# Patient Record
Sex: FEMALE | Race: WHITE | Employment: FULL TIME | URBAN - METROPOLITAN AREA
[De-identification: names, ages, dates, MRNs, and addresses within clinical notes are randomized per-mention and may not be internally consistent; named-entity substitution may affect disease eponyms.]

---

## 2017-02-03 ENCOUNTER — TELEPHONE (OUTPATIENT)
Dept: FAMILY MEDICINE | Facility: CLINIC | Age: 67
End: 2017-02-03

## 2017-02-03 NOTE — Clinical Note
[facility] [place]  [address]  [facility] [address]  [phone]                                                                                                           Glendy Saravia  [address] [place] [place] [address]    February 3, 2017          Dear Glendy Saravia,      We care about your well-being!  In order to ensure we are providing the best quality care, we have reviewed your chart and see that you are due for:     __X___ Physical   __X___ Pap Smear   __X___ Mammogram   _____ Diabetes Followup   _____ Hypertension Followup   _____ Cholesterol Followup (Provider Appointment)   _____ Cholesterol Test (Lab-Only Appointment)   _____ Other:       We can assist you in scheduling these appointments at [phone].    If you have had (or plan to have) any of these tests done at a facility other than [facility] or a [facility], please have the results from these tests sent to your primary physician at [facility].             Sincerely,    Eloy Medina MD

## 2017-02-03 NOTE — TELEPHONE ENCOUNTER
Panel Management Review      Patient has the following on her problem list: None      Composite cancer screening  Chart review shows that this patient is due/due soon for the following Pap Smear and Mammogram  Summary:    Patient is due/failing the following:   MAMMOGRAM and PAP    Action needed:   Patient needs office visit for pap and mammogram.    Type of outreach:    Sent letter.    Questions for provider review:    None                                                                                                                                    Johanna Campbell CMA

## 2017-02-08 PROBLEM — Z95.3 S/P AORTIC VALVE REPLACEMENT WITH BIOPROSTHETIC VALVE: Status: ACTIVE | Noted: 2017-02-08

## 2017-02-13 ENCOUNTER — HOSPITAL ENCOUNTER (OUTPATIENT)
Dept: CARDIOLOGY | Facility: CLINIC | Age: 67
Discharge: HOME | End: 2017-02-13
Attending: INTERNAL MEDICINE | Admitting: INTERNAL MEDICINE
Payer: COMMERCIAL

## 2017-02-13 DIAGNOSIS — I35.9 AORTIC VALVE DISORDER: ICD-10-CM

## 2017-02-13 PROCEDURE — 93306 TTE W/DOPPLER COMPLETE: CPT

## 2017-02-13 PROCEDURE — 93306 TTE W/DOPPLER COMPLETE: CPT | Mod: 26 | Performed by: INTERNAL MEDICINE

## 2017-02-14 ENCOUNTER — OFFICE VISIT (OUTPATIENT)
Dept: CARDIOLOGY | Facility: CLINIC | Age: 67
End: 2017-02-14
Attending: INTERNAL MEDICINE
Payer: COMMERCIAL

## 2017-02-14 VITALS
BODY MASS INDEX: 35.58 KG/M2 | DIASTOLIC BLOOD PRESSURE: 90 MMHG | SYSTOLIC BLOOD PRESSURE: 138 MMHG | HEART RATE: 76 BPM | HEIGHT: 66 IN | WEIGHT: 221.4 LBS

## 2017-02-14 DIAGNOSIS — Z95.3 S/P AORTIC VALVE REPLACEMENT WITH BIOPROSTHETIC VALVE: Primary | ICD-10-CM

## 2017-02-14 DIAGNOSIS — E78.2 MIXED HYPERLIPIDEMIA: ICD-10-CM

## 2017-02-14 DIAGNOSIS — R00.0 TACHYCARDIA: ICD-10-CM

## 2017-02-14 DIAGNOSIS — I10 BENIGN ESSENTIAL HYPERTENSION: ICD-10-CM

## 2017-02-14 DIAGNOSIS — I35.9 AORTIC VALVE DISORDER: ICD-10-CM

## 2017-02-14 DIAGNOSIS — R00.2 PALPITATIONS: ICD-10-CM

## 2017-02-14 PROCEDURE — 99214 OFFICE O/P EST MOD 30 MIN: CPT | Performed by: INTERNAL MEDICINE

## 2017-02-14 RX ORDER — VALACYCLOVIR HYDROCHLORIDE 1 G/1
1000 TABLET, FILM COATED ORAL PRN
COMMUNITY
End: 2017-10-27

## 2017-02-14 RX ORDER — DEXTROAMPHETAMINE SACCHARATE, AMPHETAMINE ASPARTATE MONOHYDRATE, DEXTROAMPHETAMINE SULFATE AND AMPHETAMINE SULFATE 7.5; 7.5; 7.5; 7.5 MG/1; MG/1; MG/1; MG/1
60 CAPSULE, EXTENDED RELEASE ORAL DAILY
COMMUNITY

## 2017-02-14 RX ORDER — FLUOXETINE 10 MG/1
CAPSULE ORAL
COMMUNITY

## 2017-02-14 NOTE — LETTER
2017         Eloy Medina MD    [facility]   [address] [place]   [address]      RE:     Glendy Saravia   MRN:  [id number]   : 1950      Dear Eloy:       I had the pleasure of following up on our mutual patient, Glendy Saravia.  She is a delightful 66-year-old woman.  I have not seen her for about 3 years.  In  she had a bicuspid aortic valve with aortic aneurysm.  She had homograft placed with Dr. Lockhart.  She has done well over the intervening years.  She reports no new cardiovascular complaints, specifically no chest pain, dizziness, syncope, palpitations, heart failure symptoms.  We reviewed her echocardiogram today going back to several years.  Current echo shows that the homograft is probably starting to show its age.  The estimated aortic valve area is about 1.4 cm2 with a mean gradient of 15 mmHg and trace aortic insufficiency.  By comparison, the echo in  showed an aortic valve area of 2.3 cm2 with an aortic valve gradient of 10.  This suggests still only mild aortic valve disease.  In addition the echo showed moderate LVH.  While this may be residual from her aortic valve in the first place, I am getting consistently elevated blood pressures here today.   I checked it 4-5 times.  Although the nurse's number was a little lower, I checked it and I was generally getting the 140s over the high 80s to low 90s.  Also, I see that you did blood work last year and her LDL cholesterol was over 160.  At the time of her heart surgery which was 14 years ago, there was no coronary disease.  I think we should recommend the following:  I have given her a YOOSE blood pressure machine.   I am going to have her check her blood pressure at home over the next week or two and we will have her come back and we will review the blood pressure numbers.  Given that she has LVH on her echo and she has previous aortic aneurysm, I would like to see the systolic  blood pressures in the mid 130 range or less.  In addition, I am going to recheck the cholesterol numbers, and if they are just as high now as they were last year, we would probably recommend starting a statin for prevention.  I will make these arrangements over the next couple of weeks.                      I spent quite a bit of time reviewing the echo with her, going through the blood pressure numbers, showing her how to work the automated blood pressure machine and explained to her why we plan on doing this.   This ended up being a 45-minute visit; greater than 50% counseling.      Sincerely,      Romain Tai MD

## 2017-02-14 NOTE — MR AVS SNAPSHOT
After Visit Summary   2/14/2017    Glendy Saravia    MRN: [id number]           Patient Information     Date Of Birth          1950        Visit Information        Provider Department      2/14/2017 11:00 AM Romain Tai MD [facility]        Today's Diagnoses     S/P aortic valve replacement with bioprosthetic valve    -  1    Aortic valve disorder        Mixed hyperlipidemia        Palpitations        Tachycardia        Benign essential hypertension           Follow-ups after your visit        Additional Services     Follow-Up with Cardiac Advanced Practice Provider       Or phuc or any np                  Future tests that were ordered for you today     Open Future Orders        Priority Expected Expires Ordered    Lipid Profile Routine 2/21/2017 2/14/2018 2/14/2017    ALT Routine 2/21/2017 2/14/2018 2/14/2017    Follow-Up with Cardiac Advanced Practice Provider Routine 2/21/2017 2/14/2018 2/14/2017            Who to contact     If you have questions or need follow up information about today's clinic visit or your schedule please contact [facility] directly at [phone].  Normal or non-critical lab and imaging results will be communicated to you by [employer], letter or phone within 4 business days after the clinic has received the results. If you do not hear from us within 7 days, please contact the clinic through [employer] or phone. If you have a critical or abnormal lab result, we will notify you by phone as soon as possible.  Submit refill requests through [employer] or call your pharmacy and they will forward the refill request to us. Please allow 3 business days for your refill to be completed.          Additional Information About Your Visit        MyChart Information     [employer] lets you send messages to your doctor, view your test results, renew your prescriptions, schedule appointments and more. To  "sign up, go to [email or web address] . Click on \"Log in\" on the left side of the screen, which will take you to the Welcome page. Then click on \"Sign up Now\" on the right side of the page.     You will be asked to enter the access code listed below, as well as some personal information. Please follow the directions to create your username and password.     Your access code is: [email or web address]  Expires: 5/15/2017 11:50 AM     Your access code will  in 90 days. If you need help or a new code, please call your [facility] clinic or [phone].        Care EveryWhere ID     This is your Care EveryWhere ID. This could be used by other organizations to access your [facility] medical records  [id number]        Your Vitals Were     Pulse Height BMI (Body Mass Index)             76 1.664 m (5' 5.5\") 36.28 kg/m2          Blood Pressure from Last 3 Encounters:   17 138/90   16 110/68   14 122/78    Weight from Last 3 Encounters:   17 100.4 kg (221 lb 6.4 oz)   16 93.9 kg (207 lb)   14 94.8 kg (209 lb)              We Performed the Following     Follow-Up with Cardiologist        Primary Care Provider Office Phone # Fax #    Eloy Medina [phone]       [facility] [address] [facility] [address]        Thank you!     Thank you for choosing [facility] [place]  for your care. Our goal is always to provide you with excellent care. Hearing back from our patients is one way we can continue to improve our services. Please take a few minutes to complete the written survey that you may receive in the mail after your visit with us. Thank you!             Your Updated Medication List - Protect others around you: Learn how to safely use, store and throw away your medicines at www.disposemymeds.org.          This list is accurate as of: 17 11:51 AM.  Always use your most recent med list.                   Brand Name " Dispense Instructions for use    ADDERALL XR 30 MG per 24 hr capsule   Generic drug:  amphetamine-dextroamphetamine      Take 60 mg by mouth daily       aspirin 81 MG tablet      Take 1 tablet by mouth daily.       DAILY MULTIVITAMIN PO      Take 1 tablet by mouth daily       PROZAC 10 MG capsule   Generic drug:  FLUoxetine      30 mg in am 10 mg in pm       VALTREX 1000 mg tablet   Generic drug:  valACYclovir      Take 1,000 mg by mouth as needed

## 2017-02-14 NOTE — PROGRESS NOTES
HPI and Plan:   See dictation    Orders Placed This Encounter   Procedures     Lipid Profile     ALT     Follow-Up with Cardiac Advanced Practice Provider     Orders Placed This Encounter   Medications     amphetamine-dextroamphetamine (ADDERALL XR) 30 MG per 24 hr capsule     Sig: Take 60 mg by mouth daily     FLUoxetine (PROZAC) 10 MG capsule     Si mg in am 10 mg in pm     valACYclovir (VALTREX) 1000 mg tablet     Sig: Take 1,000 mg by mouth as needed     Medications Discontinued During This Encounter   Medication Reason     Amphetamine-Dextroamphetamine (ADDERALL PO) Medication Reconciliation Clean Up     FLUoxetine (PROZAC) 10 MG capsule Medication Reconciliation Clean Up     buPROPion (WELLBUTRIN XL) 150 MG 24 hr tablet Medication Reconciliation Clean Up     valACYclovir (VALTREX) 1000 mg tablet Medication Reconciliation Clean Up         Encounter Diagnoses   Name Primary?     Aortic valve disorder      S/P aortic valve replacement with bioprosthetic valve Yes     Mixed hyperlipidemia      Palpitations      Tachycardia      Benign essential hypertension        CURRENT MEDICATIONS:  Current Outpatient Prescriptions   Medication Sig Dispense Refill     amphetamine-dextroamphetamine (ADDERALL XR) 30 MG per 24 hr capsule Take 60 mg by mouth daily       FLUoxetine (PROZAC) 10 MG capsule 30 mg in am 10 mg in pm       valACYclovir (VALTREX) 1000 mg tablet Take 1,000 mg by mouth as needed       Multiple Vitamin (DAILY MULTIVITAMIN PO) Take 1 tablet by mouth daily       aspirin 81 MG tablet Take 1 tablet by mouth daily.         ALLERGIES     Allergies   Allergen Reactions     Metformin GI Disturbance       PAST MEDICAL HISTORY:  Past Medical History   Diagnosis Date     Aortic insufficiency      Aortic stenosis      Aortic valve disease      bioprosthetic valve, composite graft for aortic valve, and acsending aorta     Attention deficit disorder      Depression      Diverticulosis      Edema       "Hyperlipidemia      Hyperlipidemia      Insulin resistance      Menopausal and perimenopausal disorder      Palpitations      Snoring      Thoracic aneurysm without mention of rupture 5/20/03     Wart        PAST SURGICAL HISTORY:  Past Surgical History   Procedure Laterality Date     Endovascular repair aneurysm thoracic aortic  5-03     Bicuspid Aortic valve with Ascending aortic aneurysm, post status replacement aortic valve and ascending aorta with composite homograft and reimplantation of her coronary arteries     Lasik       Coronary angiography adult order  4/9/03     LEFT DOMINANT CORONARY SYSTEM WITH ESSENTIALLY NORMAL CORONARY     Orthopedic surgery Left 2011     Lt knee arthroscopy      Tonsillectomy       Mammoplasty reduction       Tubal ligation         FAMILY HISTORY:  Family History   Problem Relation Age of Onset     Respiratory Father      Coronary Artery Disease No family hx of        SOCIAL HISTORY:  Social History     Social History     Marital status:      Spouse name: N/A     Number of children: N/A     Years of education: N/A     Social History Main Topics     Smoking status: Former Smoker     Types: Cigarettes     Smokeless tobacco: Never Used      Comment: stopped on/off     Alcohol use Yes      Comment: 1 per day     Drug use: No     Sexual activity: No     Other Topics Concern     Parent/Sibling W/ Cabg, Mi Or Angioplasty Before 65f 55m? No     Social History Narrative       Review of Systems:  Skin:  Negative     Eyes:  Positive for glasses  ENT:  Negative    Respiratory:  Negative    Cardiovascular:    Positive for;palpitations;edema  Gastroenterology: Positive for heartburn  Genitourinary:  Negative    Musculoskeletal:  Positive for joint swelling  Neurologic:  Positive for headaches;migraine headaches  Psychiatric:  Positive for    Heme/Lymph/Imm:  Positive for allergies  Endocrine:  Negative      Physical Exam:  Vitals: /90  Pulse 76  Ht 1.664 m (5' 5.5\")  Wt 100.4 kg " (221 lb 6.4 oz)  BMI 36.28 kg/m2    Constitutional:  cooperative, alert and oriented, well developed, well nourished, in no acute distress        Skin:  warm and dry to the touch, no apparent skin lesions or masses noted        Head:  normocephalic, no masses or lesions        Eyes:  pupils equal and round, conjunctivae and lids unremarkable, sclera white, no xanthalasma, EOMS intact, no nystagmus        ENT:  no pallor or cyanosis, dentition good        Neck:  carotid pulses are full and equal bilaterally, JVP normal, no carotid bruit, no thyromegaly        Chest:  normal breath sounds, clear to auscultation, normal A-P diameter, normal symmetry, normal respiratory excursion, no use of accessory muscles          Cardiac: regular rhythm;normal S1 and S2       systolic ejection murmur;grade 1;radiation to the base          Abdomen:  abdomen soft, non-tender, BS normoactive, no mass, no HSM, no bruits obese      Vascular:                                     hard to feel fem pulses with pannus-no bruits and both DP 2+    Extremities and Back:  no deformities, clubbing, cyanosis, erythema observed;no edema              Neurological:  affect appropriate, oriented to time, person and place          Recent Lab Results:  LIPID RESULTS:  Lab Results   Component Value Date    CHOL 247 (H) 09/30/2016    HDL 59 09/30/2016     (H) 09/30/2016    .0 (H) 06/04/2012    TRIG 109 09/30/2016       LIVER ENZYME RESULTS:  No results found for: AST, ALT    CBC RESULTS:  Lab Results   Component Value Date    WBC 4.5 09/30/2016    RBC 4.58 09/30/2016    HGB 13.8 09/30/2016    HCT 41.8 09/30/2016    MCV 91 09/30/2016    MCH 30.1 09/30/2016    MCHC 33.0 09/30/2016    RDW 12.4 09/30/2016     09/30/2016       BMP RESULTS:  Lab Results   Component Value Date     09/30/2016    POTASSIUM 3.8 09/30/2016    CHLORIDE 105 09/30/2016    CO2 29 09/30/2016    ANIONGAP 8.4 09/30/2016     (H) 09/30/2016    BUN 18  09/30/2016    BUN 22.2 06/04/2012    CR 0.85 09/30/2016    GFRESTIMATED 67 09/30/2016    GFRESTBLACK 81 09/30/2016    MANPREET 9.1 09/30/2016        A1C RESULTS:  Lab Results   Component Value Date    A1C 5.4 12/20/2013       INR RESULTS:  No results found for: INR        CC  Romain Tai MD   [facility]  [address]  [place] [id number]

## 2017-02-14 NOTE — PROGRESS NOTES
2017         Eloy Medina MD    [facility]   [address] [place]   [address]      RE:     Glendy Saravia   MRN:  [id number]   : 1950      Dear Eloy:       I had the pleasure of following up on our mutual patient, Glendy Saravia.  She is a delightful 66-year-old woman.  I have not seen her for about 3 years.  In  she had a bicuspid aortic valve with aortic aneurysm.  She had homograft placed with Dr. Lockhart.  She has done well over the intervening years.  She reports no new cardiovascular complaints, specifically no chest pain, dizziness, syncope, palpitations, heart failure symptoms.  We reviewed her echocardiogram today going back to several years.  Current echo shows that the homograft is probably starting to show its age.  The estimated aortic valve area is about 1.4 cm2 with a mean gradient of 15 mmHg and trace aortic insufficiency.  By comparison, the echo in  showed an aortic valve area of 2.3 cm2 with an aortic valve gradient of 10.  This suggests still only mild aortic valve disease.  In addition the echo showed moderate LVH.  While this may be residual from her aortic valve in the first place, I am getting consistently elevated blood pressures here today.   I checked it 4-5 times.  Although the nurse's number was a little lower, I checked it and I was generally getting the 140s over the high 80s to low 90s.  Also, I see that you did blood work last year and her LDL cholesterol was over 160.  At the time of her heart surgery which was 14 years ago, there was no coronary disease.  I think we should recommend the following:  I have given her a [employer] blood pressure machine.   I am going to have her check her blood pressure at home over the next week or two and we will have her come back and we will review the blood pressure numbers.  Given that she has LVH on her echo and she has previous aortic aneurysm, I would like to see the systolic  blood pressures in the mid 130 range or less.  In addition, I am going to recheck the cholesterol numbers, and if they are just as high now as they were last year, we would probably recommend starting a statin for prevention.  I will make these arrangements over the next couple of weeks.      I spent quite a bit of time reviewing the echo with her, going through the blood pressure numbers, showing her how to work the automated blood pressure machine and explained to her why we plan on doing this.   This ended up being a 45-minute visit; greater than 50% counseling.      Sincerely,      MD ROSA MARIA Morgan MD             D: 2017 11:53   T: 2017 15:14   MT: RESHMA      Name:     LINDSAY GORDON   MRN:      [id number]        Account:      [id number]   :      1950           Service Date: 2017      Document: [id number]

## 2017-02-24 ENCOUNTER — TRANSFERRED RECORDS (OUTPATIENT)
Dept: HEALTH INFORMATION MANAGEMENT | Facility: CLINIC | Age: 67
End: 2017-02-24

## 2017-03-01 DIAGNOSIS — E78.2 MIXED HYPERLIPIDEMIA: Primary | ICD-10-CM

## 2017-03-03 ENCOUNTER — OFFICE VISIT (OUTPATIENT)
Dept: CARDIOLOGY | Facility: CLINIC | Age: 67
End: 2017-03-03
Attending: INTERNAL MEDICINE
Payer: COMMERCIAL

## 2017-03-03 VITALS — HEIGHT: 66 IN | SYSTOLIC BLOOD PRESSURE: 130 MMHG | DIASTOLIC BLOOD PRESSURE: 80 MMHG | HEART RATE: 89 BPM

## 2017-03-03 DIAGNOSIS — I10 BENIGN ESSENTIAL HYPERTENSION: Primary | ICD-10-CM

## 2017-03-03 DIAGNOSIS — E78.2 MIXED HYPERLIPIDEMIA: ICD-10-CM

## 2017-03-03 DIAGNOSIS — E78.5 HYPERLIPIDEMIA LDL GOAL <130: ICD-10-CM

## 2017-03-03 LAB
ALT SERPL W P-5'-P-CCNC: <5 U/L (ref 5–30)
CHOLEST SERPL-MCNC: 205 MG/DL
HDLC SERPL-MCNC: 61 MG/DL
LDLC SERPL CALC-MCNC: 121 MG/DL
NONHDLC SERPL-MCNC: 144 MG/DL
TRIGL SERPL-MCNC: 115 MG/DL

## 2017-03-03 PROCEDURE — 80061 LIPID PANEL: CPT | Performed by: INTERNAL MEDICINE

## 2017-03-03 PROCEDURE — 99214 OFFICE O/P EST MOD 30 MIN: CPT | Performed by: NURSE PRACTITIONER

## 2017-03-03 PROCEDURE — 84460 ALANINE AMINO (ALT) (SGPT): CPT | Performed by: INTERNAL MEDICINE

## 2017-03-03 PROCEDURE — 36415 COLL VENOUS BLD VENIPUNCTURE: CPT | Performed by: INTERNAL MEDICINE

## 2017-03-03 NOTE — PROGRESS NOTES
HPI and Plan:   See dictation    Orders Placed This Encounter   Procedures     Follow-Up with Cardiologist       No orders of the defined types were placed in this encounter.      There are no discontinued medications.      Encounter Diagnosis   Name Primary?     Benign essential hypertension Yes       CURRENT MEDICATIONS:  Current Outpatient Prescriptions   Medication Sig Dispense Refill     amphetamine-dextroamphetamine (ADDERALL XR) 30 MG per 24 hr capsule Take 60 mg by mouth daily       FLUoxetine (PROZAC) 10 MG capsule 30 mg in am 10 mg in pm       valACYclovir (VALTREX) 1000 mg tablet Take 1,000 mg by mouth as needed       Multiple Vitamin (DAILY MULTIVITAMIN PO) Take 1 tablet by mouth daily       aspirin 81 MG tablet Take 1 tablet by mouth daily.         ALLERGIES     Allergies   Allergen Reactions     Metformin GI Disturbance       PAST MEDICAL HISTORY:  Past Medical History   Diagnosis Date     Aortic insufficiency      Aortic stenosis      Aortic valve disease 2003     bioprosthetic valve, composite graft for aortic valve, and acsending aorta     Attention deficit disorder      Depression      Diverticulosis      Edema      Hyperlipidemia      Hyperlipidemia      Insulin resistance      Menopausal and perimenopausal disorder      Palpitations      Snoring      Thoracic aneurysm without mention of rupture 5/20/03     Wart        PAST SURGICAL HISTORY:  Past Surgical History   Procedure Laterality Date     Endovascular repair aneurysm thoracic aortic  5-03     Bicuspid Aortic valve with Ascending aortic aneurysm, post status replacement aortic valve and ascending aorta with composite homograft and reimplantation of her coronary arteries     Lasik       Coronary angiography adult order  4/9/03     LEFT DOMINANT CORONARY SYSTEM WITH ESSENTIALLY NORMAL CORONARY     Orthopedic surgery Left 2011     Lt knee arthroscopy      Tonsillectomy       Mammoplasty reduction       Tubal ligation         FAMILY  "HISTORY:  Family History   Problem Relation Age of Onset     Respiratory Father      Coronary Artery Disease No family hx of        SOCIAL HISTORY:  Social History     Social History     Marital status:      Spouse name: N/A     Number of children: N/A     Years of education: N/A     Social History Main Topics     Smoking status: Former Smoker     Types: Cigarettes     Smokeless tobacco: Never Used      Comment: stopped on/off     Alcohol use Yes      Comment: 1 per day     Drug use: No     Sexual activity: No     Other Topics Concern     Parent/Sibling W/ Cabg, Mi Or Angioplasty Before 65f 55m? No     Social History Narrative       Review of Systems:  Skin:  Negative       Eyes:  Positive for glasses    ENT:  Negative      Respiratory:  Negative       Cardiovascular:    Positive for    Gastroenterology: Positive for heartburn occ.  Genitourinary:  Negative      Musculoskeletal:  Negative      Neurologic:  Negative      Psychiatric:  Positive for depression    Heme/Lymph/Imm:  Positive for allergies    Endocrine:  Negative        Physical Exam:  Vitals: /80  Pulse 89  Ht 1.664 m (5' 5.51\")    Constitutional:  cooperative, alert and oriented, well developed, well nourished, in no acute distress        Skin:  warm and dry to the touch        Head:  normocephalic        Eyes:  sclera white        ENT:  no pallor or cyanosis        Neck:  carotid pulses are full and equal bilaterally        Chest:  normal breath sounds, clear to auscultation, normal A-P diameter, normal symmetry, normal respiratory excursion, no use of accessory muscles          Cardiac: regular rhythm;normal S1 and S2       systolic ejection murmur;grade 1;radiation to the base          Abdomen:  abdomen soft obese      Vascular: not assessed this visit                                        Extremities and Back:  no deformities, clubbing, cyanosis, erythema observed;no edema              Neurological:  affect appropriate, oriented to " time, person and place;no gross motor deficits              CC  Romain Tai MD   [facility]  [address]  [place] [phone]

## 2017-03-03 NOTE — PROGRESS NOTES
Cardiology Clinic Progress Note  Glendy Saravia MRN# [id number]   YOB: 1950 Age: 66 year old     Reason For Visit:  follow-up hypertension and hyperlipidemia    Primary Cardiologist:   Dr. Tai           History of Presenting Illness:    Glendy Saravia is a pleasant 66 year old patient with a past cardiac history significant for bicuspid aortic valve with aortic aneurysm status post homograft with Dr. Lockhart.  Most recent echocardiogram showing aortic valve area 1.4 cm  with mean gradient of 15 mmHg, trace aortic insufficiency with mild aortic valve disease, and moderate LVH.  Compared to echo from 2013 with area of 2.3 cm  and aortic valve gradient of 10.  Pt was last seen by Dr. Tai who noted a blood pressure of 140s/80s-90s and an LDL of 166 in 2016.  He loaned her a blood pressure machine and recommended follow-up of blood pressure and cholesterol.  He recommended blood pressures in the mid 130 systolic given her previous aortic aneurysm.    Pt presents today for hyperlipidemia and hypertension follow-up.  Her lipid profile today shows total cholesterol 205 HDL 61  and triglycerides 115, ALT less than five.  These results were reviewed with her today.  Her LDL has decreased from 166 to 121 this year.  She has not been on any statin previously and has no history of coronary artery disease.  Since her LDL has decreased, I do not believe we need to start a cholesterol medication today.  She also discusses plans to start exercising more routinely, after she retires next week.  Her blood pressure today in the clinic is 130/80.  Her home blood pressure readings are 117-145/70s-80s.  She states that she often checked this while she had been doing things around the house which is why she believes there are some readings in the 140s.  She is resistant to starting an antihypertensive medication.  She would prefer to wait until she retires next week to see if this reduces her stress.  She  also has plans to start exercising routinely to try and lose some weight.  She requested a follow-up in six months and will bring blood pressure readings with her at that time. She did return the loaned blood pressure cuff and has plans to buy one for herself. Patient reports no chest pain, shortness of breath, PND, orthopnea, presyncope, syncope, edema, heart racing, or palpitations.    Current Cardiac Medications   Aspirin 81 mg daily                    Assessment and Plan:     Plan  1.  Recommend routine exercise to help with weight loss   2.  Call if BP consistenly greater than 140/90, check BP daily, record, and bring to next visit   2.  Follow up with ERIN in six weeks to reassess blood pressure      1. Hypertension    130/80 with home readings 117-147/70-80    Dr. Parra would like systolic blood pressures in the mid 130s given her previous aortic aneurysm    Patient is resistant to starting antihypertensive medication and prefers to try weight loss to help reduce her blood pressure first      2. Hyperlipidemia      today    This is improved from 1 year ago, which was 166    Given that she has no CAD history, we will not start any cholesterol medication at this time       3. Bicuspid aortic valve with aortic aneurysm status post homograft         Thank you for allowing me to participate in this delightful patient's care.      This note was completed in part using Dragon voice recognition software. Although reviewed after completion, some word and grammatical errors may occur.    Rosemary Jefferson, APRN, CNP           Data:   All laboratory data reviewed:    LAST CHOLESTEROL:  Lab Results   Component Value Date    CHOL 205 03/03/2017     Lab Results   Component Value Date    HDL 61 03/03/2017     Lab Results   Component Value Date     03/03/2017    .0 06/04/2012     Lab Results   Component Value Date    TRIG 115 03/03/2017     No results found for: CHOLHDLRATIO    LAST BMP:  Lab  Results   Component Value Date     09/30/2016      Lab Results   Component Value Date    POTASSIUM 3.8 09/30/2016     Lab Results   Component Value Date    CHLORIDE 105 09/30/2016     Lab Results   Component Value Date    MANPREET 9.1 09/30/2016     Lab Results   Component Value Date    CO2 29 09/30/2016     Lab Results   Component Value Date    BUN 18 09/30/2016    BUN 22.2 06/04/2012     Lab Results   Component Value Date    CR 0.85 09/30/2016     Lab Results   Component Value Date     09/30/2016       LAST CBC:  Lab Results   Component Value Date    WBC 4.5 09/30/2016     Lab Results   Component Value Date    RBC 4.58 09/30/2016     Lab Results   Component Value Date    HGB 13.8 09/30/2016     Lab Results   Component Value Date    HCT 41.8 09/30/2016     Lab Results   Component Value Date    MCV 91 09/30/2016     Lab Results   Component Value Date    MCH 30.1 09/30/2016     Lab Results   Component Value Date    MCHC 33.0 09/30/2016     Lab Results   Component Value Date    RDW 12.4 09/30/2016     Lab Results   Component Value Date     09/30/2016

## 2017-03-03 NOTE — LETTER
3/3/2017    Eloy Medina MD  [facility]   [address]  [place] [address]    RE: Glendy Saravia       Dear Colleague,    I had the pleasure of seeing Glendy Saravia in the [facility].    HPI and Plan:   See dictation    Orders Placed This Encounter   Procedures     Follow-Up with Cardiologist       No orders of the defined types were placed in this encounter.      There are no discontinued medications.      Encounter Diagnosis   Name Primary?     Benign essential hypertension Yes       CURRENT MEDICATIONS:  Current Outpatient Prescriptions   Medication Sig Dispense Refill     amphetamine-dextroamphetamine (ADDERALL XR) 30 MG per 24 hr capsule Take 60 mg by mouth daily       FLUoxetine (PROZAC) 10 MG capsule 30 mg in am 10 mg in pm       valACYclovir (VALTREX) 1000 mg tablet Take 1,000 mg by mouth as needed       Multiple Vitamin (DAILY MULTIVITAMIN PO) Take 1 tablet by mouth daily       aspirin 81 MG tablet Take 1 tablet by mouth daily.         ALLERGIES     Allergies   Allergen Reactions     Metformin GI Disturbance       PAST MEDICAL HISTORY:  Past Medical History   Diagnosis Date     Aortic insufficiency      Aortic stenosis      Aortic valve disease 2003     bioprosthetic valve, composite graft for aortic valve, and acsending aorta     Attention deficit disorder      Depression      Diverticulosis      Edema      Hyperlipidemia      Hyperlipidemia      Insulin resistance      Menopausal and perimenopausal disorder      Palpitations      Snoring      Thoracic aneurysm without mention of rupture 5/20/03     Wart        PAST SURGICAL HISTORY:  Past Surgical History   Procedure Laterality Date     Endovascular repair aneurysm thoracic aortic  5-03     Bicuspid Aortic valve with Ascending aortic aneurysm, post status replacement aortic valve and ascending aorta with composite homograft and reimplantation of her coronary arteries     Renetta Clarke  "angiography adult order  4/9/03     LEFT DOMINANT CORONARY SYSTEM WITH ESSENTIALLY NORMAL CORONARY     Orthopedic surgery Left 2011     Lt knee arthroscopy      Tonsillectomy       Mammoplasty reduction       Tubal ligation         FAMILY HISTORY:  Family History   Problem Relation Age of Onset     Respiratory Father      Coronary Artery Disease No family hx of        SOCIAL HISTORY:  Social History     Social History     Marital status:      Spouse name: N/A     Number of children: N/A     Years of education: N/A     Social History Main Topics     Smoking status: Former Smoker     Types: Cigarettes     Smokeless tobacco: Never Used      Comment: stopped on/off     Alcohol use Yes      Comment: 1 per day     Drug use: No     Sexual activity: No     Other Topics Concern     Parent/Sibling W/ Cabg, Mi Or Angioplasty Before 65f 55m? No     Social History Narrative       Review of Systems:  Skin:  Negative       Eyes:  Positive for glasses    ENT:  Negative      Respiratory:  Negative       Cardiovascular:    Positive for    Gastroenterology: Positive for heartburn occ.  Genitourinary:  Negative      Musculoskeletal:  Negative      Neurologic:  Negative      Psychiatric:  Positive for depression    Heme/Lymph/Imm:  Positive for allergies    Endocrine:  Negative        Physical Exam:  Vitals: /80  Pulse 89  Ht 1.664 m (5' 5.51\")    Constitutional:  cooperative, alert and oriented, well developed, well nourished, in no acute distress        Skin:  warm and dry to the touch        Head:  normocephalic        Eyes:  sclera white        ENT:  no pallor or cyanosis        Neck:  carotid pulses are full and equal bilaterally        Chest:  normal breath sounds, clear to auscultation, normal A-P diameter, normal symmetry, normal respiratory excursion, no use of accessory muscles          Cardiac: regular rhythm;normal S1 and S2       systolic ejection murmur;grade 1;radiation to the base          Abdomen:  abdomen " soft obese      Vascular: not assessed this visit                                        Extremities and Back:  no deformities, clubbing, cyanosis, erythema observed;no edema              Neurological:  affect appropriate, oriented to time, person and place;no gross motor deficits              CC  Romain Tai MD   [facility]  [address]  [place] [address]                Cardiology Clinic Progress Note  Glendy Saravia MRN# [id number]   YOB: 1950 Age: 66 year old     Reason For Visit:  follow-up hypertension and hyperlipidemia    Primary Cardiologist:   Dr. Tai           History of Presenting Illness:    Glendy Saravia is a pleasant 66 year old patient with a past cardiac history significant for bicuspid aortic valve with aortic aneurysm status post homograft with Dr. Lockhart.  Most recent echocardiogram showing aortic valve area 1.4 cm  with mean gradient of 15 mmHg, trace aortic insufficiency with mild aortic valve disease, and moderate LVH.  Compared to echo from 2013 with area of 2.3 cm  and aortic valve gradient of 10.  Pt was last seen by Dr. Tai who noted a blood pressure of 140s/80s-90s and an LDL of 166 in 2016.  He loaned her a blood pressure machine and recommended follow-up of blood pressure and cholesterol.  He recommended blood pressures in the mid 130 systolic given her previous aortic aneurysm.    Pt presents today for hyperlipidemia and hypertension follow-up.  Her lipid profile today shows total cholesterol 205 HDL 61  and triglycerides 115, ALT less than five.  These results were reviewed with her today.  Her LDL has decreased from 166 to 121 this year.  She has not been on any statin previously and has no history of coronary artery disease.  Since her LDL has decreased, I do not believe we need to start a cholesterol medication today.  She also discusses plans to start exercising more routinely, after she retires next week.  Her blood  pressure today in the clinic is 130/80.  Her home blood pressure readings are 117-145/70s-80s.  She states that she often checked this while she had been doing things around the house which is why she believes there are some readings in the 140s.  She is resistant to starting an antihypertensive medication.  She would prefer to wait until she retires next week to see if this reduces her stress.  She also has plans to start exercising routinely to try and lose some weight.  She requested a follow-up in six months and will bring blood pressure readings with her at that time. She did return the loaned blood pressure cuff and has plans to buy one for herself. Patient reports no chest pain, shortness of breath, PND, orthopnea, presyncope, syncope, edema, heart racing, or palpitations.    Current Cardiac Medications   Aspirin 81 mg daily                    Assessment and Plan:     Plan  1.  Recommend routine exercise to help with weight loss   2.  Call if BP consistenly greater than 140/90, check BP daily, record, and bring to next visit   2.  Follow up with ERIN in six weeks to reassess blood pressure      1. Hypertension    130/80 with home readings 117-147/70-80    Dr. Parra would like systolic blood pressures in the mid 130s given her previous aortic aneurysm    Patient is resistant to starting antihypertensive medication and prefers to try weight loss to help reduce her blood pressure first      2. Hyperlipidemia      today    This is improved from 1 year ago, which was 166    Given that she has no CAD history, we will not start any cholesterol medication at this time       3. Bicuspid aortic valve with aortic aneurysm status post homograft         Thank you for allowing me to participate in this delightful patient's care.      This note was completed in part using Dragon voice recognition software. Although reviewed after completion, some word and grammatical errors may occur.    DAVIAN Chen,  CNP           Data:   All laboratory data reviewed:    LAST CHOLESTEROL:  Lab Results   Component Value Date    CHOL 205 03/03/2017     Lab Results   Component Value Date    HDL 61 03/03/2017     Lab Results   Component Value Date     03/03/2017    .0 06/04/2012     Lab Results   Component Value Date    TRIG 115 03/03/2017     No results found for: CHOLHDLRATIO    LAST BMP:  Lab Results   Component Value Date     09/30/2016      Lab Results   Component Value Date    POTASSIUM 3.8 09/30/2016     Lab Results   Component Value Date    CHLORIDE 105 09/30/2016     Lab Results   Component Value Date    MANPREET 9.1 09/30/2016     Lab Results   Component Value Date    CO2 29 09/30/2016     Lab Results   Component Value Date    BUN 18 09/30/2016    BUN 22.2 06/04/2012     Lab Results   Component Value Date    CR 0.85 09/30/2016     Lab Results   Component Value Date     09/30/2016       LAST CBC:  Lab Results   Component Value Date    WBC 4.5 09/30/2016     Lab Results   Component Value Date    RBC 4.58 09/30/2016     Lab Results   Component Value Date    HGB 13.8 09/30/2016     Lab Results   Component Value Date    HCT 41.8 09/30/2016     Lab Results   Component Value Date    MCV 91 09/30/2016     Lab Results   Component Value Date    MCH 30.1 09/30/2016     Lab Results   Component Value Date    MCHC 33.0 09/30/2016     Lab Results   Component Value Date    RDW 12.4 09/30/2016     Lab Results   Component Value Date     09/30/2016     Thank you for allowing me to participate in the care of your patient.    Sincerely,     DAVIAN Chen CNP     [facility]

## 2017-03-03 NOTE — MR AVS SNAPSHOT
After Visit Summary   3/3/2017    Lindsay Gordon    MRN: [id number]           Patient Information     Date Of Birth          1950        Visit Information        Provider Department      3/3/2017 1:50 PM Rosemary Jefferson APRN CNP [facility]        Today's Diagnoses     Benign essential hypertension    -  1      Care Instructions    Thanks for coming into [facility] clinic today.    We discussed:   Continue checking blood pressure at home, record and bring to next visit   Dr. Tai recommends Dr. Chavez     Medication changes:    No changes today     Results:   Results for LINDSAY GORDON (MRN [id number]) as of 3/3/2017 14:11   Ref. Range 3/3/2017 12:53   ALT Latest Ref Range: 5 - 30 U/L <5 (L)   Cholesterol Latest Ref Range: <200 mg/dL 205 (H)   HDL Cholesterol Latest Ref Range: >49 mg/dL 61   LDL Cholesterol Calculated Latest Ref Range: <100 mg/dL 121 (H)   Non HDL Cholesterol Latest Ref Range: <130 mg/dL 144 (H)   Triglycerides Latest Ref Range: <150 mg/dL 115       Follow up:   Rosemary FRANCO or Dr. Tai in 6 weeks       Please call my nurse Rosemarie at [phone] with any questions or concerns.    Scheduling phone number: [phone]  Reminder: Please bring in all current medications, over the counter supplements and vitamin bottles to your next appointment.                Follow-ups after your visit        Additional Services     Follow-Up with Cardiologist                 Future tests that were ordered for you today     Open Future Orders        Priority Expected Expires Ordered    Follow-Up with Cardiologist Routine 4/14/2017 3/3/2019 3/3/2017            Who to contact     If you have questions or need follow up information about today's clinic visit or your schedule please contact [facility] directly at [phone].  Normal or non-critical lab and imaging results will be  "communicated to you by MyChart, letter or phone within 4 business days after the clinic has received the results. If you do not hear from us within 7 days, please contact the clinic through [employer] or phone. If you have a critical or abnormal lab result, we will notify you by phone as soon as possible.  Submit refill requests through [employer] or call your pharmacy and they will forward the refill request to us. Please allow 3 business days for your refill to be completed.          Additional Information About Your Visit        [employer] Information     [employer] lets you send messages to your doctor, view your test results, renew your prescriptions, schedule appointments and more. To sign up, go to [email or web address] . Click on \"Log in\" on the left side of the screen, which will take you to the Welcome page. Then click on \"Sign up Now\" on the right side of the page.     You will be asked to enter the access code listed below, as well as some personal information. Please follow the directions to create your username and password.     Your access code is: [email or web address]  Expires: 5/15/2017 11:50 AM     Your access code will  in 90 days. If you need help or a new code, please call your [place] clinic or [phone].        Care EveryWhere ID     This is your Care EveryWhere ID. This could be used by other organizations to access your [place] medical records  [id number]        Your Vitals Were     Pulse Height                89 1.664 m (5' 5.51\")           Blood Pressure from Last 3 Encounters:   17 130/80   17 138/90   16 110/68    Weight from Last 3 Encounters:   17 100.4 kg (221 lb 6.4 oz)   16 93.9 kg (207 lb)   14 94.8 kg (209 lb)               Primary Care Provider Office Phone # Fax #    Eloy Medina [phone]       [facility] [address] [facility] [address]        Thank you!     Thank you for choosing [facility] " [facility] [place]  for your care. Our goal is always to provide you with excellent care. Hearing back from our patients is one way we can continue to improve our services. Please take a few minutes to complete the written survey that you may receive in the mail after your visit with us. Thank you!             Your Updated Medication List - Protect others around you: Learn how to safely use, store and throw away your medicines at www.disposemymeds.org.          This list is accurate as of: 3/3/17  2:15 PM.  Always use your most recent med list.                   Brand Name Dispense Instructions for use    ADDERALL XR 30 MG per 24 hr capsule   Generic drug:  amphetamine-dextroamphetamine      Take 60 mg by mouth daily       aspirin 81 MG tablet      Take 1 tablet by mouth daily.       DAILY MULTIVITAMIN PO      Take 1 tablet by mouth daily       PROZAC 10 MG capsule   Generic drug:  FLUoxetine      30 mg in am 10 mg in pm       VALTREX 1000 mg tablet   Generic drug:  valACYclovir      Take 1,000 mg by mouth as needed

## 2017-03-16 ENCOUNTER — TELEPHONE (OUTPATIENT)
Dept: FAMILY MEDICINE | Facility: CLINIC | Age: 67
End: 2017-03-16

## 2017-03-16 NOTE — LETTER
[facility]  [address]  [place] [address]  [phone]                                                                                                           Glendy Saravia  [address] [place] [address]    March 16, 2017          Dear Glendy Saravia,      We care about your well-being!  In order to ensure we are providing the best quality care, we have reviewed your chart and see that you are due for:     __x___ Physical   __x___ Pap Smear   __x___ Mammogram   _____ Diabetes Followup   _____ Hypertension Followup   _____ Cholesterol Followup (Provider Appointment)   _____ Cholesterol Test (Lab-Only Appointment)   _____ Other:       We can assist you in scheduling these appointments at [phone].    If you have had (or plan to have) any of these tests done at a facility other than [facility] or a [facility], please have the results from these tests sent to your primary physician at [facility].                 Sincerely,    Eloy Medina MD

## 2017-03-16 NOTE — TELEPHONE ENCOUNTER
Panel Management Review      Patient has the following on her problem list: None      Composite cancer screening  Chart review shows that this patient is due/due soon for the following Mammogram  Summary:    Patient is due/failing the following:   MAMMOGRAM    Action needed:   Patient needs office visit for Mammogram.    Type of outreach:    Sent letter.    Questions for provider review:    None                                                                                                                                    Lakisha Larkin LPN     Chart routed to Care Team .

## 2017-03-16 NOTE — LETTER
[facility]  [address]  [place] [address]  [phone]                                                                                                           Glendy Saravia  [address] [place] [address]    March 16, 2017          Dear Glendy Saravia,      We care about your well-being!  In order to ensure we are providing the best quality care, we have reviewed your chart and see that you are due for:     _____ Physical   _____ Pap Smear   __x___ Mammogram   _____ Diabetes Followup   _____ Hypertension Followup   _____ Cholesterol Followup (Provider Appointment)   _____ Cholesterol Test (Lab-Only Appointment)   _____ Other:       We can assist you in scheduling these appointments at [phone].    If you have had (or plan to have) any of these tests done at a facility other than [facility] or a [facility], please have the results from these tests sent to your primary physician at [facility].                 Sincerely,    Eloy Medina MD

## 2017-03-16 NOTE — TELEPHONE ENCOUNTER
Panel Management Review      Patient has the following on her problem list: None      Composite cancer screening  Chart review shows that this patient is due/due soon for the following Pap Smear and Mammogram  Summary:    Patient is due/failing the following:   MAMMOGRAM, PAP and PHQ9    Action needed:   Patient needs office visit for Physical/pap/mammogram.    Type of outreach:    Sent letter.    Questions for provider review:    None                                                                                                                                    Lakisha Larkin LPN     Chart routed to Care Team .

## 2017-04-14 ENCOUNTER — OFFICE VISIT (OUTPATIENT)
Dept: CARDIOLOGY | Facility: CLINIC | Age: 67
End: 2017-04-14
Attending: NURSE PRACTITIONER
Payer: COMMERCIAL

## 2017-04-14 VITALS
DIASTOLIC BLOOD PRESSURE: 84 MMHG | HEIGHT: 67 IN | BODY MASS INDEX: 34.95 KG/M2 | HEART RATE: 80 BPM | SYSTOLIC BLOOD PRESSURE: 128 MMHG | WEIGHT: 222.7 LBS

## 2017-04-14 DIAGNOSIS — Z95.3 S/P AORTIC VALVE REPLACEMENT WITH BIOPROSTHETIC VALVE: ICD-10-CM

## 2017-04-14 DIAGNOSIS — I10 BENIGN ESSENTIAL HYPERTENSION: Primary | ICD-10-CM

## 2017-04-14 PROCEDURE — 99213 OFFICE O/P EST LOW 20 MIN: CPT | Performed by: NURSE PRACTITIONER

## 2017-04-14 NOTE — PROGRESS NOTES
HPI and Plan:   See dictation    Orders Placed This Encounter   Procedures     Follow-Up with Cardiologist       No orders of the defined types were placed in this encounter.      Medications Discontinued During This Encounter   Medication Reason     valACYclovir (VALTREX) 1000 mg tablet Medication Reconciliation Clean Up         Encounter Diagnoses   Name Primary?     Benign essential hypertension Yes     S/P aortic valve replacement with bioprosthetic valve        CURRENT MEDICATIONS:  Current Outpatient Prescriptions   Medication Sig Dispense Refill     amphetamine-dextroamphetamine (ADDERALL XR) 30 MG per 24 hr capsule Take 60 mg by mouth daily       FLUoxetine (PROZAC) 10 MG capsule 30 mg in am       valACYclovir (VALTREX) 1000 mg tablet Take 1,000 mg by mouth as needed       Multiple Vitamin (DAILY MULTIVITAMIN PO) Take 1 tablet by mouth daily       aspirin 81 MG tablet Take 1 tablet by mouth daily.       [DISCONTINUED] valACYclovir (VALTREX) 1000 mg tablet TAKE ONE TABLET BY MOUTH ONE TIME DAILY  30 tablet 0       ALLERGIES     Allergies   Allergen Reactions     Metformin GI Disturbance       PAST MEDICAL HISTORY:  Past Medical History:   Diagnosis Date     Aortic insufficiency      Aortic stenosis      Aortic valve disease 2003    bioprosthetic valve, composite graft for aortic valve, and acsending aorta     Attention deficit disorder      Depression      Diverticulosis      Edema      Hyperlipidemia      Hyperlipidemia      Insulin resistance      Menopausal and perimenopausal disorder      Palpitations      Snoring      Thoracic aneurysm without mention of rupture 5/20/03     Wart        PAST SURGICAL HISTORY:  Past Surgical History:   Procedure Laterality Date     CORONARY ANGIOGRAPHY ADULT ORDER  4/9/03    LEFT DOMINANT CORONARY SYSTEM WITH ESSENTIALLY NORMAL CORONARY     ENDOVASCULAR REPAIR ANEURYSM THORACIC AORTIC  5-03    Bicuspid Aortic valve with Ascending aortic aneurysm, post status replacement  "aortic valve and ascending aorta with composite homograft and reimplantation of her coronary arteries     LASIK       MAMMOPLASTY REDUCTION       ORTHOPEDIC SURGERY Left 2011    Lt knee arthroscopy      TONSILLECTOMY       TUBAL LIGATION         FAMILY HISTORY:  Family History   Problem Relation Age of Onset     Respiratory Father      Coronary Artery Disease No family hx of        SOCIAL HISTORY:  Social History     Social History     Marital status:      Spouse name: N/A     Number of children: N/A     Years of education: N/A     Social History Main Topics     Smoking status: Former Smoker     Types: Cigarettes     Smokeless tobacco: Never Used      Comment: stopped on/off     Alcohol use Yes      Comment: 1 per day     Drug use: No     Sexual activity: No     Other Topics Concern     Parent/Sibling W/ Cabg, Mi Or Angioplasty Before 65f 55m? No     Social History Narrative       Review of Systems:  Skin:  Negative       Eyes:  Positive for glasses    ENT:  Negative      Respiratory:  Positive for cough     Cardiovascular:  Negative;palpitations;chest pain;dizziness;lightheadedness;exercise intolerance;cyanosis;syncope or near-syncope;edema;fatigue      Gastroenterology: Positive for heartburn    Genitourinary:  not assessed      Musculoskeletal:  Positive for joint stiffness    Neurologic:  Negative      Psychiatric:  Positive for depression    Heme/Lymph/Imm:  Positive for allergies    Endocrine:  Negative        Physical Exam:  Vitals: /84 (BP Location: Left arm, Cuff Size: Adult Large)  Pulse 80  Ht 1.689 m (5' 6.5\")  Wt 101 kg (222 lb 11.2 oz)  BMI 35.41 kg/m2    Constitutional:  cooperative, alert and oriented, well developed, well nourished, in no acute distress        Skin:  warm and dry to the touch        Head:  normocephalic        Eyes:  sclera white        ENT:  no pallor or cyanosis        Neck:  carotid pulses are full and equal bilaterally        Chest:  normal breath sounds, clear " to auscultation, normal A-P diameter, normal symmetry, normal respiratory excursion, no use of accessory muscles          Cardiac: normal S1 and S2;regular rhythm       systolic ejection murmur;grade 1;radiation to the base          Abdomen:  abdomen soft        Vascular: not assessed this visit                                        Extremities and Back:  no deformities, clubbing, cyanosis, erythema observed;no edema              Neurological:  affect appropriate, oriented to time, person and place;no gross motor deficits              CC  Rosemary Jefferson, DAVIAN CNP   [facility]  [address]  [place] [address]

## 2017-04-14 NOTE — LETTER
4/14/2017    Eloy Medina MD  [facility]   [address]  [facility] [address]    RE: Glendy Saravia       Dear Colleague,    I had the pleasure of seeing Glendy Saravia in the [facility].    HPI and Plan:   See dictation    Orders Placed This Encounter   Procedures     Follow-Up with Cardiologist       No orders of the defined types were placed in this encounter.      Medications Discontinued During This Encounter   Medication Reason     valACYclovir (VALTREX) 1000 mg tablet Medication Reconciliation Clean Up         Encounter Diagnoses   Name Primary?     Benign essential hypertension Yes     S/P aortic valve replacement with bioprosthetic valve        CURRENT MEDICATIONS:  Current Outpatient Prescriptions   Medication Sig Dispense Refill     amphetamine-dextroamphetamine (ADDERALL XR) 30 MG per 24 hr capsule Take 60 mg by mouth daily       FLUoxetine (PROZAC) 10 MG capsule 30 mg in am       valACYclovir (VALTREX) 1000 mg tablet Take 1,000 mg by mouth as needed       Multiple Vitamin (DAILY MULTIVITAMIN PO) Take 1 tablet by mouth daily       aspirin 81 MG tablet Take 1 tablet by mouth daily.       [DISCONTINUED] valACYclovir (VALTREX) 1000 mg tablet TAKE ONE TABLET BY MOUTH ONE TIME DAILY  30 tablet 0       ALLERGIES     Allergies   Allergen Reactions     Metformin GI Disturbance       PAST MEDICAL HISTORY:  Past Medical History:   Diagnosis Date     Aortic insufficiency      Aortic stenosis      Aortic valve disease 2003    bioprosthetic valve, composite graft for aortic valve, and acsending aorta     Attention deficit disorder      Depression      Diverticulosis      Edema      Hyperlipidemia      Hyperlipidemia      Insulin resistance      Menopausal and perimenopausal disorder      Palpitations      Snoring      Thoracic aneurysm without mention of rupture 5/20/03     Wart        PAST SURGICAL HISTORY:  Past Surgical History:   Procedure Laterality Date      "CORONARY ANGIOGRAPHY ADULT ORDER  4/9/03    LEFT DOMINANT CORONARY SYSTEM WITH ESSENTIALLY NORMAL CORONARY     ENDOVASCULAR REPAIR ANEURYSM THORACIC AORTIC  5-03    Bicuspid Aortic valve with Ascending aortic aneurysm, post status replacement aortic valve and ascending aorta with composite homograft and reimplantation of her coronary arteries     LASIK       MAMMOPLASTY REDUCTION       ORTHOPEDIC SURGERY Left 2011    Lt knee arthroscopy      TONSILLECTOMY       TUBAL LIGATION         FAMILY HISTORY:  Family History   Problem Relation Age of Onset     Respiratory Father      Coronary Artery Disease No family hx of        SOCIAL HISTORY:  Social History     Social History     Marital status:      Spouse name: N/A     Number of children: N/A     Years of education: N/A     Social History Main Topics     Smoking status: Former Smoker     Types: Cigarettes     Smokeless tobacco: Never Used      Comment: stopped on/off     Alcohol use Yes      Comment: 1 per day     Drug use: No     Sexual activity: No     Other Topics Concern     Parent/Sibling W/ Cabg, Mi Or Angioplasty Before 65f 55m? No     Social History Narrative       Review of Systems:  Skin:  Negative       Eyes:  Positive for glasses    ENT:  Negative      Respiratory:  Positive for cough     Cardiovascular:  Negative;palpitations;chest pain;dizziness;lightheadedness;exercise intolerance;cyanosis;syncope or near-syncope;edema;fatigue      Gastroenterology: Positive for heartburn    Genitourinary:  not assessed      Musculoskeletal:  Positive for joint stiffness    Neurologic:  Negative      Psychiatric:  Positive for depression    Heme/Lymph/Imm:  Positive for allergies    Endocrine:  Negative        Physical Exam:  Vitals: /84 (BP Location: Left arm, Cuff Size: Adult Large)  Pulse 80  Ht 1.689 m (5' 6.5\")  Wt 101 kg (222 lb 11.2 oz)  BMI 35.41 kg/m2    Constitutional:  cooperative, alert and oriented, well developed, well nourished, in no " acute distress        Skin:  warm and dry to the touch        Head:  normocephalic        Eyes:  sclera white        ENT:  no pallor or cyanosis        Neck:  carotid pulses are full and equal bilaterally        Chest:  normal breath sounds, clear to auscultation, normal A-P diameter, normal symmetry, normal respiratory excursion, no use of accessory muscles          Cardiac: normal S1 and S2;regular rhythm       systolic ejection murmur;grade 1;radiation to the base          Abdomen:  abdomen soft        Vascular: not assessed this visit                                        Extremities and Back:  no deformities, clubbing, cyanosis, erythema observed;no edema              Neurological:  affect appropriate, oriented to time, person and place;no gross motor deficits              CC  Rosemary Jefferson, APRN CNP   [facility]  [address]  [place] [address]                Cardiology Clinic Progress Note  Glendy Saravia MRN# [id number]   YOB: 1950 Age: 66 year old     Reason For Visit:  follow-up hypertension   Primary Cardiologist:   Dr. Tai           History of Presenting Illness:    Glendy Saravia is a pleasant 66 year old patient with a past cardiac history significant for bicuspid aortic valve with aortic aneurysm status post homograft with Dr. Lockhart.  Most recent echocardiogram showing aortic valve area 1.4 cm  with mean gradient of 15 mmHg, trace aortic insufficiency with mild aortic valve disease, and moderate LVH.  Compared to echo from 2013 with area of 2.3 cm  and aortic valve gradient of 10.      Pt saw Dr. Tai February 2017 who noted a blood pressure of 140s/80s-90s and an LDL of 166 in 2016.  He loaned her a blood pressure machine and recommended follow-up of blood pressure and cholesterol.  He recommended blood pressures in the mid 130s systolic, given her previous aortic aneurysm. Patient was last seen by me on 3/3/2017.  Her blood pressure  was 130/80 but home blood pressures had range from 117-145 systolic.  She preferred to try lifestyle modifications with low sodium diet and exercise to help decrease her blood pressure. Her lipid profile showed LDL decreased from 166 down to 121 and therefore was not started on any medication for cholesterol.     Pt presents today for hypertension follow-up. Unfortunately, she has not purchased a home blood pressure cuff yet. Since I last saw her, she has retired and has been trying to get more routine exercise with walking.  She has also been eating a low sodium diet.  She has not noted any weight loss yet though. Her blood pressure today in the clinic is 128/84 and she does not have any home blood pressure readings. Patient reports no chest pain, shortness of breath, PND, orthopnea, presyncope, syncope, edema, heart racing, or palpitations.    Current Cardiac Medications   Aspirin 81 mg daily                    Assessment and Plan:     Plan  1.  Routine exercise to help with weight loss, low Na diet, and mediterranean diet  2.  Call if BP consistenly greater than 135/90  2.  Follow up with Dr. Tai in 1 year      1. Hypertension    128/84     Dr. Parra would like systolic blood pressures in the mid 130s given her previous aortic aneurysm    Patient is hesistant to start antihypertensive medication and prefers to continue with lifestyle modification.       2. Hyperlipidemia          This is improved from 1 year ago, which was 166    Given that she has no CAD history, we will not start any cholesterol medication at this time       3. Bicuspid aortic valve with aortic aneurysm status post homograft         Thank you for allowing me to participate in this delightful patient's care.      This note was completed in part using Dragon voice recognition software. Although reviewed after completion, some word and grammatical errors may occur.    Rosemary Jefferson, APRN, CNP         Data:   All laboratory  data reviewed    Thank you for allowing me to participate in the care of your patient.    Sincerely,     DAVIAN Chen [facility]

## 2017-04-14 NOTE — MR AVS SNAPSHOT
After Visit Summary   4/14/2017    Glendy Saravia    MRN: [id number]           Patient Information     Date Of Birth          1950        Visit Information        Provider Department      4/14/2017 1:10 PM Rosemary Jefferson APRN CNP [facility]        Today's Diagnoses     S/P aortic valve replacement with bioprosthetic valve    -  1    Benign essential hypertension        Palpitations        Aortic valve disease          Care Instructions    Thanks for coming into [facility] clinic today.    We discussed:   Check into getting an Omron blood pressure cuff   Call the clinic if blood pressure is consistently greater than 135/90.     Medication changes:    No changes     Follow up:   Dr. Tai  In 1 year     Please call my nurse Rosemarie at [phone] with any questions or concerns.    Scheduling phone number: [phone]  Reminder: Please bring in all current medications, over the counter supplements and vitamin bottles to your next appointment.                Follow-ups after your visit        Additional Services     Follow-Up with Cardiologist                 Future tests that were ordered for you today     Open Future Orders        Priority Expected Expires Ordered    Follow-Up with Cardiologist Routine 4/14/2018 4/14/2019 4/14/2017            Who to contact     If you have questions or need follow up information about today's clinic visit or your schedule please contact [facility] directly at [phone].  Normal or non-critical lab and imaging results will be communicated to you by MyChart, letter or phone within 4 business days after the clinic has received the results. If you do not hear from us within 7 days, please contact the clinic through MyChart or phone. If you have a critical or abnormal lab result, we will notify you by phone as soon as possible.  Submit refill requests  "through [employer] or call your pharmacy and they will forward the refill request to us. Please allow 3 business days for your refill to be completed.          Additional Information About Your Visit        [employer] Information     [employer] lets you send messages to your doctor, view your test results, renew your prescriptions, schedule appointments and more. To sign up, go to [email or web address] . Click on \"Log in\" on the left side of the screen, which will take you to the Welcome page. Then click on \"Sign up Now\" on the right side of the page.     You will be asked to enter the access code listed below, as well as some personal information. Please follow the directions to create your username and password.     Your access code is: [email or web address]  Expires: 5/15/2017 12:50 PM     Your access code will  in 90 days. If you need help or a new code, please call your [facility] clinic or [phone].        Care EveryWhere ID     This is your Care EveryWhere ID. This could be used by other organizations to access your [facility] medical records  [id number]        Your Vitals Were     Pulse Height BMI (Body Mass Index)             80 1.689 m (5' 6.5\") 35.41 kg/m2          Blood Pressure from Last 3 Encounters:   17 128/84   17 130/80   17 138/90    Weight from Last 3 Encounters:   17 101 kg (222 lb 11.2 oz)   17 100.4 kg (221 lb 6.4 oz)   16 93.9 kg (207 lb)              We Performed the Following     Follow-Up with Cardiologist        Primary Care Provider Office Phone # Fax #    Eloy Medina [phone]       [facility] [address]  [facility] [address]        Thank you!     Thank you for choosing [facility] [place]  for your care. Our goal is always to provide you with excellent care. Hearing back from our patients is one way we can continue to improve our services. Please take a few minutes to complete the " written survey that you may receive in the mail after your visit with us. Thank you!             Your Updated Medication List - Protect others around you: Learn how to safely use, store and throw away your medicines at www.disposemymeds.org.          This list is accurate as of: 4/14/17  1:35 PM.  Always use your most recent med list.                   Brand Name Dispense Instructions for use    ADDERALL XR 30 MG per 24 hr capsule   Generic drug:  amphetamine-dextroamphetamine      Take 60 mg by mouth daily       aspirin 81 MG tablet      Take 1 tablet by mouth daily.       DAILY MULTIVITAMIN PO      Take 1 tablet by mouth daily       PROZAC 10 MG capsule   Generic drug:  FLUoxetine      30 mg in am       VALTREX 1000 mg tablet   Generic drug:  valACYclovir      Take 1,000 mg by mouth as needed

## 2017-04-14 NOTE — PATIENT INSTRUCTIONS
Thanks for coming into [facility] clinic today.    We discussed:   Check into getting an Omron blood pressure cuff   Call the clinic if blood pressure is consistently greater than 135/90.     Medication changes:    No changes     Follow up:   Dr. Tai  In 1 year     Please call my nurse Rosemarie at [phone] with any questions or concerns.    Scheduling phone number: [phone]  Reminder: Please bring in all current medications, over the counter supplements and vitamin bottles to your next appointment.

## 2017-04-14 NOTE — PROGRESS NOTES
Cardiology Clinic Progress Note  Glendy Saravia MRN# [id number]   YOB: 1950 Age: 66 year old     Reason For Visit:  follow-up hypertension   Primary Cardiologist:   Dr. Tai           History of Presenting Illness:    Glendy Saravia is a pleasant 66 year old patient with a past cardiac history significant for bicuspid aortic valve with aortic aneurysm status post homograft with Dr. Lockhart.  Most recent echocardiogram showing aortic valve area 1.4 cm  with mean gradient of 15 mmHg, trace aortic insufficiency with mild aortic valve disease, and moderate LVH.  Compared to echo from 2013 with area of 2.3 cm  and aortic valve gradient of 10.      Pt saw Dr. Tai February 2017 who noted a blood pressure of 140s/80s-90s and an LDL of 166 in 2016.  He loaned her a blood pressure machine and recommended follow-up of blood pressure and cholesterol.  He recommended blood pressures in the mid 130s systolic, given her previous aortic aneurysm. Patient was last seen by me on 3/3/2017.  Her blood pressure was 130/80 but home blood pressures had range from 117-145 systolic.  She preferred to try lifestyle modifications with low sodium diet and exercise to help decrease her blood pressure. Her lipid profile showed LDL decreased from 166 down to 121 and therefore was not started on any medication for cholesterol.     Pt presents today for hypertension follow-up. Unfortunately, she has not purchased a home blood pressure cuff yet. Since I last saw her, she has retired and has been trying to get more routine exercise with walking.  She has also been eating a low sodium diet.  She has not noted any weight loss yet though. Her blood pressure today in the clinic is 128/84 and she does not have any home blood pressure readings. Patient reports no chest pain, shortness of breath, PND, orthopnea, presyncope, syncope, edema, heart racing, or palpitations.    Current Cardiac Medications   Aspirin 81 mg daily                     Assessment and Plan:     Plan  1.  Routine exercise to help with weight loss, low Na diet, and mediterranean diet  2.  Call if BP consistenly greater than 135/90  2.  Follow up with Dr. Tai in 1 year      1. Hypertension    128/84     Dr. Parra would like systolic blood pressures in the mid 130s given her previous aortic aneurysm    Patient is hesistant to start antihypertensive medication and prefers to continue with lifestyle modification.       2. Hyperlipidemia          This is improved from 1 year ago, which was 166    Given that she has no CAD history, we will not start any cholesterol medication at this time       3. Bicuspid aortic valve with aortic aneurysm status post homograft         Thank you for allowing me to participate in this delightful patient's care.      This note was completed in part using Dragon voice recognition software. Although reviewed after completion, some word and grammatical errors may occur.    DAVIAN Chen, CNP           Data:   All laboratory data reviewed

## 2017-06-15 ENCOUNTER — TELEPHONE (OUTPATIENT)
Dept: FAMILY MEDICINE | Facility: CLINIC | Age: 67
End: 2017-06-15

## 2017-06-15 NOTE — LETTER
[facility]  [address]  [facility] [address]  [phone]                                                                                                           Glendy Saravia  [address] [place] [address]    Tamiko 15, 2017          Dear Glendy Saravia,      We care about your well-being!  In order to ensure we are providing the best quality care, we have reviewed your chart and see that you are due for:     __X__ Physical   __X__ Pap Smear   _____ Mammogram   _____ Diabetes Followup   _____ Hypertension Followup   _____ Cholesterol Followup (Provider Appointment)   _____ Cholesterol Test (Lab-Only Appointment)   _____ Other:       We can assist you in scheduling these appointments at [phone].    If you have had (or plan to have) any of these tests done at a facility other than [facility] or a [facility], please have the results from these tests sent to your primary physician at [facility].             Sincerely,    Eloy Medina MD

## 2017-06-15 NOTE — TELEPHONE ENCOUNTER
Panel Management Review      Patient has the following on her problem list:      ADHD (ages 6-12)  Review Medication Prescription dates:              Is this the first RX date?   NO - When was the previous RX date?  None  (if more than 120 days then a 30 day follow up is still needed)  Review Quality Dashboard or scorecard for index dates for patient.       Composite cancer screening  Chart review shows that this patient is due/due soon for the following Pap Smear and Mammogram  Summary:    Patient is due/failing the following:   MAMMOGRAM and PAP    Action needed:   Patient needs office visit for physical with PAP.    Type of outreach:    Sent letter.    Questions for provider review:    None                                                                                                                                    Princess DARREL Pagan [facility]       Chart routed to none .

## 2017-06-17 ENCOUNTER — HEALTH MAINTENANCE LETTER (OUTPATIENT)
Age: 67
End: 2017-06-17

## 2017-10-27 ENCOUNTER — RADIANT APPOINTMENT (OUTPATIENT)
Dept: MAMMOGRAPHY | Facility: CLINIC | Age: 67
End: 2017-10-27
Payer: COMMERCIAL

## 2017-10-27 ENCOUNTER — OFFICE VISIT (OUTPATIENT)
Dept: FAMILY MEDICINE | Facility: CLINIC | Age: 67
End: 2017-10-27
Payer: COMMERCIAL

## 2017-10-27 VITALS
TEMPERATURE: 97.6 F | WEIGHT: 211.5 LBS | SYSTOLIC BLOOD PRESSURE: 132 MMHG | HEART RATE: 80 BPM | HEIGHT: 66 IN | BODY MASS INDEX: 33.99 KG/M2 | RESPIRATION RATE: 20 BRPM | DIASTOLIC BLOOD PRESSURE: 80 MMHG | OXYGEN SATURATION: 96 %

## 2017-10-27 DIAGNOSIS — Z00.00 ENCOUNTER FOR ROUTINE ADULT HEALTH EXAMINATION WITHOUT ABNORMAL FINDINGS: Primary | ICD-10-CM

## 2017-10-27 DIAGNOSIS — I35.0 NONRHEUMATIC AORTIC VALVE STENOSIS: ICD-10-CM

## 2017-10-27 DIAGNOSIS — Z23 ENCOUNTER FOR IMMUNIZATION: ICD-10-CM

## 2017-10-27 DIAGNOSIS — I35.9 AORTIC VALVE DISEASE: ICD-10-CM

## 2017-10-27 DIAGNOSIS — B00.9 HERPES SIMPLEX VIRUS (HSV) INFECTION: ICD-10-CM

## 2017-10-27 DIAGNOSIS — Z12.4 SCREENING FOR MALIGNANT NEOPLASM OF CERVIX: ICD-10-CM

## 2017-10-27 DIAGNOSIS — Z12.31 VISIT FOR SCREENING MAMMOGRAM: ICD-10-CM

## 2017-10-27 DIAGNOSIS — Z95.3 S/P AORTIC VALVE REPLACEMENT WITH BIOPROSTHETIC VALVE: ICD-10-CM

## 2017-10-27 DIAGNOSIS — E78.00 PURE HYPERCHOLESTEROLEMIA: ICD-10-CM

## 2017-10-27 LAB
ALBUMIN UR-MCNC: ABNORMAL MG/DL
APPEARANCE UR: CLEAR
BILIRUB UR QL STRIP: NEGATIVE
COLOR UR AUTO: YELLOW
GLUCOSE UR STRIP-MCNC: NEGATIVE MG/DL
HGB UR QL STRIP: NEGATIVE
KETONES UR STRIP-MCNC: NEGATIVE MG/DL
LEUKOCYTE ESTERASE UR QL STRIP: NEGATIVE
NITRATE UR QL: NEGATIVE
PH UR STRIP: 7 PH (ref 5–7)
RBC #/AREA URNS AUTO: NORMAL /HPF
SOURCE: ABNORMAL
SP GR UR STRIP: 1.02 (ref 1–1.03)
UROBILINOGEN UR STRIP-ACNC: 0.2 EU/DL (ref 0.2–1)
WBC #/AREA URNS AUTO: NORMAL /HPF

## 2017-10-27 PROCEDURE — G0476 HPV COMBO ASSAY CA SCREEN: HCPCS | Performed by: FAMILY MEDICINE

## 2017-10-27 PROCEDURE — 80053 COMPREHEN METABOLIC PANEL: CPT | Performed by: FAMILY MEDICINE

## 2017-10-27 PROCEDURE — G0402 INITIAL PREVENTIVE EXAM: HCPCS | Performed by: FAMILY MEDICINE

## 2017-10-27 PROCEDURE — G0008 ADMIN INFLUENZA VIRUS VAC: HCPCS | Performed by: FAMILY MEDICINE

## 2017-10-27 PROCEDURE — G0145 SCR C/V CYTO,THINLAYER,RESCR: HCPCS | Performed by: FAMILY MEDICINE

## 2017-10-27 PROCEDURE — G0009 ADMIN PNEUMOCOCCAL VACCINE: HCPCS | Performed by: FAMILY MEDICINE

## 2017-10-27 PROCEDURE — 84443 ASSAY THYROID STIM HORMONE: CPT | Performed by: FAMILY MEDICINE

## 2017-10-27 PROCEDURE — 80061 LIPID PANEL: CPT | Performed by: FAMILY MEDICINE

## 2017-10-27 PROCEDURE — 90662 IIV NO PRSV INCREASED AG IM: CPT | Performed by: FAMILY MEDICINE

## 2017-10-27 PROCEDURE — 36415 COLL VENOUS BLD VENIPUNCTURE: CPT | Performed by: FAMILY MEDICINE

## 2017-10-27 PROCEDURE — 90670 PCV13 VACCINE IM: CPT | Performed by: FAMILY MEDICINE

## 2017-10-27 PROCEDURE — 81001 URINALYSIS AUTO W/SCOPE: CPT | Performed by: FAMILY MEDICINE

## 2017-10-27 PROCEDURE — G0202 SCR MAMMO BI INCL CAD: HCPCS | Mod: TC

## 2017-10-27 RX ORDER — VALACYCLOVIR HYDROCHLORIDE 1 G/1
1000 TABLET, FILM COATED ORAL PRN
Qty: 21 TABLET | Refills: 1 | Status: SHIPPED | OUTPATIENT
Start: 2017-10-27 | End: 2018-11-08

## 2017-10-27 NOTE — MR AVS SNAPSHOT
"              After Visit Summary   10/27/2017    Glendy Saravia    MRN: [id number]           Patient Information     Date Of Birth          1950        Visit Information        Provider Department      10/27/2017 10:30 AM Eloy Medina MD [facility]        Today's Diagnoses     Encounter for routine adult health examination without abnormal findings    -  1    Screening for malignant neoplasm of cervix        Encounter for immunization        Herpes simplex virus (HSV) infection           Follow-ups after your visit        Who to contact     If you have questions or need follow up information about today's clinic visit or your schedule please contact [facility] directly at [phone].  Normal or non-critical lab and imaging results will be communicated to you by MyChart, letter or phone within 4 business days after the clinic has received the results. If you do not hear from us within 7 days, please contact the clinic through MyChart or phone. If you have a critical or abnormal lab result, we will notify you by phone as soon as possible.  Submit refill requests through [employer] or call your pharmacy and they will forward the refill request to us. Please allow 3 business days for your refill to be completed.          Additional Information About Your Visit        MyChart Information     [employer] lets you send messages to your doctor, view your test results, renew your prescriptions, schedule appointments and more. To sign up, go to [email or web address] . Click on \"Log in\" on the left side of the screen, which will take you to the Welcome page. Then click on \"Sign up Now\" on the right side of the page.     You will be asked to enter the access code listed below, as well as some personal information. Please follow the directions to create your username and password.     Your access code is: [email or web address]  Expires: 1/25/2018 11:07 AM     Your " "access code will  in 90 days. If you need help or a new code, please call your [facility] clinic or [phone].        Care EveryWhere ID     This is your Care EveryWhere ID. This could be used by other organizations to access your [facility] medical records  [id number]        Your Vitals Were     Pulse Temperature Respirations Height Pulse Oximetry BMI (Body Mass Index)    80 97.6  F (36.4  C) (Tympanic) 20 5' 5.5\" (1.664 m) 96% 34.66 kg/m2       Blood Pressure from Last 3 Encounters:   10/27/17 132/80   17 128/84   17 130/80    Weight from Last 3 Encounters:   10/27/17 211 lb 8 oz (95.9 kg)   17 222 lb 11.2 oz (101 kg)   17 221 lb 6.4 oz (100.4 kg)              We Performed the Following     *UA reflex to Microscopic     ADMIN: Vaccine, Initial (17757)     Comprehensive metabolic panel     HC FLU VACCINE, INCREASED ANTIGEN, PRESV FREE     HPV High Risk Types DNA Cervical     Lipid panel reflex to direct LDL Fasting     Pap imaged thin layer screen with HPV - recommended age 30 - 65 years (select HPV order below)     Pneumococcal vaccine 13 valent PCV13 IM (Prevnar) [71940]     TSH with free T4 reflex     VACCINE ADMINISTRATION, EACH ADDITIONAL          Where to get your medicines      These medications were sent to [id number] - [place] - [address]  [address] [place] [address]     Phone:  [phone]     valACYclovir 1000 mg tablet          Primary Care Provider Office Phone # Fax #    Eloy Medina [phone] [address]  [facility] [address]        Equal Access to Services     [facility] JERMAN : Sunil Esparza, kavin mckeon, alexandra kaalmada wyatt, savannah kang. So [facility] [phone].    ATENCIÓN: Si habla español, tiene a baer disposición servicios gratuitos de asistencia lingüística. Llame al [phone].    We comply with applicable federal civil rights laws and " [place] laws. We do not discriminate on the basis of race, color, national origin, age, disability, sex, sexual orientation, or gender identity.            Thank you!     Thank you for choosing [facility]  for your care. Our goal is always to provide you with excellent care. Hearing back from our patients is one way we can continue to improve our services. Please take a few minutes to complete the written survey that you may receive in the mail after your visit with us. Thank you!             Your Updated Medication List - Protect others around you: Learn how to safely use, store and throw away your medicines at www.disposemymeds.org.          This list is accurate as of: 10/27/17 11:07 AM.  Always use your most recent med list.                   Brand Name Dispense Instructions for use Diagnosis    ADDERALL XR 30 MG per 24 hr capsule   Generic drug:  amphetamine-dextroamphetamine      Take 60 mg by mouth daily        aspirin 81 MG tablet      Take 1 tablet by mouth daily.        DAILY MULTIVITAMIN PO      Take 1 tablet by mouth daily        PROZAC 10 MG capsule   Generic drug:  FLUoxetine      30 mg in am        valACYclovir 1000 mg tablet    VALTREX    21 tablet    Take 1 tablet (1,000 mg) by mouth as needed    Herpes simplex virus (HSV) infection

## 2017-10-27 NOTE — NURSING NOTE
Injectable Influenza Immunization Documentation    1.  Are you sick today? (Fever of 100.5 or higher on the day of the clinic)   No    2.  Have you ever had Guillain-[place] Syndrome within 6 weeks of an influenza vaccionation?  No    3. Do you have a life-threatening allergy to eggs?  No    4. Do you have a life-threatening allergy to a component of the vaccine? May include antibiotics, gelatin or latex.  No     5. Have you ever had a reaction to a dose of flu vaccine that needed immediate medical attention?  No     Form completed by Princess DARREL Pagan, CMA      Screening Questionnaire for Adult Immunization    Are you sick today?   No   Do you have allergies to medications, food, a vaccine component or latex?   No   Have you ever had a serious reaction after receiving a vaccination?   No   Do you have a long-term health problem with heart disease, lung disease, asthma, kidney disease, metabolic disease (e.g. diabetes), anemia, or other blood disorder?   No   Do you have cancer, leukemia, HIV/AIDS, or any other immune system problem?   No   In the past 3 months, have you taken medications that affect  your immune system, such as prednisone, other steroids, or anticancer drugs; drugs for the treatment of rheumatoid arthritis, Crohn s disease, or psoriasis; or have you had radiation treatments?   No   Have you had a seizure, or a brain or other nervous system problem?   No   During the past year, have you received a transfusion of blood or blood     products, or been given immune (gamma) globulin or antiviral drug?   No   For women: Are you pregnant or is there a chance you could become        pregnant during the next month?   No   Have you received any vaccinations in the past 4 weeks?   No     Immunization questionnaire answers were all negative.        Per orders of Dr. Medina, injection of Prevnar 13 and Influenza given by Princess DARREL Pagan. Patient instructed to remain in clinic for 15 minutes afterwards, and to  report any adverse reaction to me immediately.       Screening performed by Princess DARREL Pagan on 10/27/2017 at 11:22 AM.

## 2017-10-27 NOTE — PROGRESS NOTES
SUBJECTIVE:   Glendy Saravia is a 67 year old female who presents for Preventive Visit.    Are you in the first 12 months of your Medicare coverage?  Yes,  Visual Acuity:  Right Eye: 20/20   Left Eye: 20/32  Both Eyes: 20/20    Physical   Annual:     Getting at least 3 servings of Calcium per day::  NO    Bi-annual eye exam::  Yes    Dental care twice a year::  Yes    Sleep apnea or symptoms of sleep apnea::  None    Frequency of exercise::  4-5 days/week    Duration of exercise::  30-45 minutes    Taking medications regularly::  Yes    Medication side effects::  Not applicable    Additional concerns today::  No    Answers for HPI/ROS submitted by the patient on 10/27/2017   PHQ-2 Score: 0    COGNITIVE SCREEN  1) Repeat 3 items (Banana, Sunrise, Chair)    2) Clock draw: NORMAL  3) 3 item recall: Recalls 3 objects  Results: 3 items recalled: COGNITIVE IMPAIRMENT LESS LIKELY    Mini-CogTM Copyright CAMILLA Elizalde. Licensed by the author for use in [facility]; reprinted with permission (soob@[facility]). All rights reserved.          Reviewed and updated as needed this visit by clinical staff         Reviewed and updated as needed this visit by Provider      Social History   Substance Use Topics     Smoking status: Former Smoker     Types: Cigarettes     Smokeless tobacco: Never Used      Comment: stopped on/off     Alcohol use Yes      Comment: 1 per day       The patient does not drink >3 drinks per day nor >7 drinks per week.            Today's PHQ-2 Score: PHQ-2 ( 1999 Pfizer) 10/27/2017   Q1: Little interest or pleasure in doing things 0   Q2: Feeling down, depressed or hopeless 0   PHQ-2 Score 0   Q1: Little interest or pleasure in doing things Not at all   Q2: Feeling down, depressed or hopeless Not at all   PHQ-2 Score 0       Do you feel safe in your environment - Yes    Do you have a Health Care Directive?: Yes: Patient states has Advance Directive and will bring in a copy to clinic.    Current  providers sharing in care for this patient include:   Patient Care Team:  Eloy Medina MD as PCP - General (Family Practice)      Hearing impairment: Yes, Need to ask people to speak up or repeat themselves.    Ability to successfully perform activities of daily living: Yes, no assistance needed     Fall risk:  Fallen 2 or more times in the past year?: Yes  Any fall with injury in the past year?: No  Timed Up and Go Test (>13.5 is fall risk; contact physician) : 2      Home safety:  none identified      The following health maintenance items are reviewed in Epic and correct as of today:Health Maintenance   Topic Date Due     ADVANCE DIRECTIVE PLANNING Q5 YRS  06/25/2007     MAMMO SCREEN Q2 YR (SYSTEM ASSIGNED)  12/20/2015     PAP Q3 YR  01/24/2017     FALL RISK ASSESSMENT  06/25/2017     DEXA SCAN SCREENING (SYSTEM ASSIGNED)  06/25/2017     PNEUMOCOCCAL (1 of 2 - PCV13) 06/25/2017     INFLUENZA VACCINE (SYSTEM ASSIGNED)  09/01/2017     COLONOSCOPY Q5 YR  02/06/2019     LIPID SCREEN Q5 YR FEMALE (SYSTEM ASSIGNED)  03/03/2022     TETANUS IMMUNIZATION (SYSTEM ASSIGNED)  06/02/2022     HEPATITIS C SCREENING  Completed     Labs reviewed in EPIC    Pneumonia Vaccine:Adults age 65+ who have not received previous Pneumovax (PPSV23) or PCV13 as an adult: Should first be given PCV13 AND then should be given PPSV23 6-12 months after PCV13    Review of Systems  C: NEGATIVE for fever, chills, change in weight  I: NEGATIVE for worrisome rashes, moles or lesions  E: NEGATIVE for vision changes or irritation  E/M: NEGATIVE for ear, mouth and throat problems  R: NEGATIVE for significant cough or SOB  B: NEGATIVE for masses, tenderness or discharge  CV: NEGATIVE for chest pain, palpitations or peripheral edema  GI: NEGATIVE for nausea, abdominal pain, heartburn, or change in bowel habits  : NEGATIVE for frequency, dysuria, or hematuria  M: NEGATIVE for significant arthralgias or myalgia  N: NEGATIVE for weakness, dizziness or  "paresthesias  E: NEGATIVE for temperature intolerance, skin/hair changes  H: NEGATIVE for bleeding problems  P: NEGATIVE for changes in mood or affect    OBJECTIVE:   There were no vitals taken for this visit. Estimated body mass index is 35.41 kg/(m^2) as calculated from the following:    Height as of 4/14/17: 5' 6.5\" (1.689 m).    Weight as of 4/14/17: 222 lb 11.2 oz (101 kg).  Physical Exam  GENERAL APPEARANCE: healthy, alert and no distress  EYES: Eyes grossly normal to inspection, PERRL and conjunctivae and sclerae normal  HENT: ear canals and TM's normal, nose and mouth without ulcers or lesions, oropharynx clear and oral mucous membranes moist  NECK: no adenopathy, no asymmetry, masses, or scars and thyroid normal to palpation  RESP: lungs clear to auscultation - no rales, rhonchi or wheezes  BREAST: normal without masses, tenderness or nipple discharge and no palpable axillary masses or adenopathy  CV: regular rate and rhythm, normal S1 S2, no S3 or S4, no murmur, click or rub, no peripheral edema and peripheral pulses strong  ABDOMEN: soft, nontender, no hepatosplenomegaly, no masses and bowel sounds normal   (female): normal female external genitalia, normal urethral meatus, vaginal mucosal atrophy noted, normal cervix, adnexae, and uterus without masses. and Pap smear obtained  MS: no musculoskeletal defects are noted and gait is age appropriate without ataxia  SKIN: no suspicious lesions or rashes  NEURO: Normal strength and tone, sensory exam grossly normal, mentation intact and speech normal  PSYCH: mentation appears normal and affect normal/bright    ASSESSMENT / PLAN:   Glendy was seen today for medicare visit.    Diagnoses and all orders for this visit:    Encounter for routine adult health examination without abnormal findings  -     Lipid panel reflex to direct LDL Fasting  -     Comprehensive metabolic panel  -     *UA reflex to Microscopic  -     TSH with free T4 reflex    Screening for " "malignant neoplasm of cervix  -     Pap imaged thin layer screen with HPV - recommended age 30 - 65 years (select HPV order below)  -     HPV High Risk Types DNA Cervical    Herpes simplex virus (HSV) infection  -     valACYclovir (VALTREX) 1000 mg tablet; Take 1 tablet (1,000 mg) by mouth as needed    Nonrheumatic aortic valve stenosis    Aortic valve disease    S/P aortic valve replacement with bioprosthetic valve    Encounter for immunization  -     Pneumococcal vaccine 13 valent PCV13 IM (Prevnar) [60227]  -     ADMIN: Vaccine, Initial (04891)  -     HC FLU VACCINE, INCREASED ANTIGEN, PRESV FREE  -     VACCINE ADMINISTRATION, EACH ADDITIONAL        End of Life Planning:  Patient currently has an advanced directive: Yes.  Practitioner is supportive of decision.    COUNSELING:  Reviewed preventive health counseling, as reflected in patient instructions       Regular exercise       Healthy diet/nutrition       Immunizations    Vaccinated for: Influenza and Pneumococcal              Estimated body mass index is 35.41 kg/(m^2) as calculated from the following:    Height as of 4/14/17: 5' 6.5\" (1.689 m).    Weight as of 4/14/17: 222 lb 11.2 oz (101 kg).  Weight management plan: Discussed healthy diet and exercise guidelines and patient will follow up in 12 months in clinic to re-evaluate.   reports that she has quit smoking. Her smoking use included Cigarettes. She has never used smokeless tobacco.        Appropriate preventive services were discussed with this patient, including applicable screening as appropriate for cardiovascular disease, diabetes, osteopenia/osteoporosis, and glaucoma.  As appropriate for age/gender, discussed screening for colorectal cancer, prostate cancer, breast cancer, and cervical cancer. Checklist reviewing preventive services available has been given to the patient.    Reviewed patients plan of care and provided an AVS. The Basic Care Plan (routine screening as documented in Health " Maintenance) for Glendy meets the Care Plan requirement. This Care Plan has been established and reviewed with the Patient.    Counseling Resources:  ATP IV Guidelines  Pooled Cohorts Equation Calculator  Breast Cancer Risk Calculator  FRAX Risk Assessment  ICSI Preventive Guidelines  Dietary Guidelines for Americans, 2010  USDA's MyPlate  ASA Prophylaxis  Lung CA Screening    Eloy Medina MD  [facility]

## 2017-10-27 NOTE — LETTER
[facility]  [address]  [place] [address]  [phone]        February 5, 2018    Glendy Saravia  [address] [place] [address]              Dear Glendy Saravia    This is to remind you that your fasting labs are  due.    You may call our office at [phone]  to schedule lab appointment.    Please disregard this notice if you have already had your labs drawn or made an appointment.        Sincerely,        Eloy Medina MD

## 2017-10-27 NOTE — NURSING NOTE
"Chief Complaint   Patient presents with     Medicare Visit     FASTING       Initial /80 (BP Location: Left arm, Patient Position: Chair, Cuff Size: Adult Regular)  Pulse 80  Temp 97.6  F (36.4  C) (Tympanic)  Resp 20  Ht 5' 5.5\" (1.664 m)  Wt 211 lb 8 oz (95.9 kg)  SpO2 96%  BMI 34.66 kg/m2 Estimated body mass index is 34.66 kg/(m^2) as calculated from the following:    Height as of this encounter: 5' 5.5\" (1.664 m).    Weight as of this encounter: 211 lb 8 oz (95.9 kg).  Medication Reconciliation: complete     Princess DARREL Pagan      "

## 2017-10-28 LAB
ALBUMIN SERPL-MCNC: 3.8 G/DL (ref 3.4–5)
ALP SERPL-CCNC: 83 U/L (ref 40–150)
ALT SERPL W P-5'-P-CCNC: 28 U/L (ref 0–50)
ANION GAP SERPL CALCULATED.3IONS-SCNC: 10 MMOL/L (ref 3–14)
AST SERPL W P-5'-P-CCNC: 17 U/L (ref 0–45)
BILIRUB SERPL-MCNC: 0.4 MG/DL (ref 0.2–1.3)
BUN SERPL-MCNC: 24 MG/DL (ref 7–30)
CALCIUM SERPL-MCNC: 8.9 MG/DL (ref 8.5–10.1)
CHLORIDE SERPL-SCNC: 108 MMOL/L (ref 94–109)
CHOLEST SERPL-MCNC: 249 MG/DL
CO2 SERPL-SCNC: 24 MMOL/L (ref 20–32)
CREAT SERPL-MCNC: 0.8 MG/DL (ref 0.52–1.04)
GFR SERPL CREATININE-BSD FRML MDRD: 72 ML/MIN/1.7M2
GLUCOSE SERPL-MCNC: 102 MG/DL (ref 70–99)
HDLC SERPL-MCNC: 55 MG/DL
LDLC SERPL CALC-MCNC: 165 MG/DL
NONHDLC SERPL-MCNC: 194 MG/DL
POTASSIUM SERPL-SCNC: 4.1 MMOL/L (ref 3.4–5.3)
PROT SERPL-MCNC: 6.4 G/DL (ref 6.8–8.8)
SODIUM SERPL-SCNC: 142 MMOL/L (ref 133–144)
TRIGL SERPL-MCNC: 143 MG/DL
TSH SERPL DL<=0.005 MIU/L-ACNC: 2.28 MU/L (ref 0.4–4)

## 2017-10-31 LAB
COPATH REPORT: NORMAL
PAP: NORMAL

## 2017-11-02 LAB
FINAL DIAGNOSIS: NORMAL
HPV HR 12 DNA CVX QL NAA+PROBE: NEGATIVE
HPV16 DNA SPEC QL NAA+PROBE: NEGATIVE
HPV18 DNA SPEC QL NAA+PROBE: NEGATIVE
SPECIMEN DESCRIPTION: NORMAL

## 2018-11-03 ENCOUNTER — RADIANT APPOINTMENT (OUTPATIENT)
Dept: MAMMOGRAPHY | Facility: CLINIC | Age: 68
End: 2018-11-03
Attending: FAMILY MEDICINE
Payer: COMMERCIAL

## 2018-11-03 DIAGNOSIS — Z12.31 VISIT FOR SCREENING MAMMOGRAM: ICD-10-CM

## 2018-11-03 PROCEDURE — 77067 SCR MAMMO BI INCL CAD: CPT | Mod: TC

## 2018-11-08 DIAGNOSIS — B00.9 HERPES SIMPLEX VIRUS (HSV) INFECTION: ICD-10-CM

## 2018-11-08 NOTE — TELEPHONE ENCOUNTER
"Requested Prescriptions   Pending Prescriptions Disp Refills     valACYclovir (VALTREX) 1000 mg tablet  Last Written Prescription Date:  10/27/2017  Last Fill Quantity: 21,  # refills: 1   Last office visit: 10/27/2017 with prescribing provider: ALIYAH  Future Office Visit:     21 tablet 1     Sig: Take 1 tablet (1,000 mg) by mouth as needed    Antivirals for Herpes Protocol Failed    11/8/2018  3:36 PM       Failed - Recent (12 mo) or future (30 days) visit within the authorizing provider's specialty    Patient had office visit in the last 12 months or has a visit in the next 30 days with authorizing provider or within the authorizing provider's specialty.  See \"Patient Info\" tab in inbasket, or \"Choose Columns\" in Meds & Orders section of the refill encounter.             Failed - Normal serum creatinine on file in past 12 months    Recent Labs   Lab Test  10/27/17   1121   CR  0.80            Passed - Patient is age 12 or older          "

## 2018-11-08 NOTE — TELEPHONE ENCOUNTER
Reason for Call:  Medication or medication refill:    Do you use a [facility]?  Name of the pharmacy and phone number for the current request:  [id number] - [place] - [address]    Name of the medication requested: valACYclovir (VALTREX) 1000 mg tablet     Other request:     Can we leave a detailed message on this number? YES    Phone number patient can be reached at: Home number on file [phone] (home)    Best Time: anytime     Call taken on 11/8/2018 at 3:29 PM by Xuan Cherry

## 2018-11-12 RX ORDER — VALACYCLOVIR HYDROCHLORIDE 1 G/1
1000 TABLET, FILM COATED ORAL PRN
Qty: 30 TABLET | Refills: 0 | Status: SHIPPED | OUTPATIENT
Start: 2018-11-12

## 2018-11-12 NOTE — TELEPHONE ENCOUNTER
Medication is being filled for 1 time refill only due to:  Future labs ordered creatinine. Patient needs to be seen because it has been more than one year since last visit.

## 2019-02-07 ENCOUNTER — TRANSFERRED RECORDS (OUTPATIENT)
Dept: HEALTH INFORMATION MANAGEMENT | Facility: CLINIC | Age: 69
End: 2019-02-07

## 2019-06-05 ENCOUNTER — TRANSFERRED RECORDS (OUTPATIENT)
Dept: HEALTH INFORMATION MANAGEMENT | Facility: CLINIC | Age: 69
End: 2019-06-05

## 2019-06-21 ENCOUNTER — TELEPHONE (OUTPATIENT)
Dept: FAMILY MEDICINE | Facility: CLINIC | Age: 69
End: 2019-06-21

## 2019-06-21 NOTE — TELEPHONE ENCOUNTER
Our goal is to have forms completed within 72 hours, however some forms may require a visit or additional information.    What clinic location was the form placed at [facility] or [place]     Who is the form from?   Where did the form come from? Faxed to clinic   The form was placed in the inbox of Eloy Medina MD      Please fax to [phone]  Phone number: [phone]      Additional comments: aaliyahy PT plan of care     Call take on 6/21/2019 at 4:19 PM by Maria García

## 2019-10-01 ENCOUNTER — HEALTH MAINTENANCE LETTER (OUTPATIENT)
Age: 69
End: 2019-10-01

## 2019-12-15 ENCOUNTER — HEALTH MAINTENANCE LETTER (OUTPATIENT)
Age: 69
End: 2019-12-15

## 2020-08-23 NOTE — PATIENT INSTRUCTIONS
Thanks for coming into [facility] today.    We discussed:   Continue checking blood pressure at home, record and bring to next visit   Dr. Tai recommends Dr. Chavez     Medication changes:    No changes today     Results:   Results for LINDSAY GORDON (MRN [id number]) as of 3/3/2017 14:11   Ref. Range 3/3/2017 12:53   ALT Latest Ref Range: 5 - 30 U/L <5 (L)   Cholesterol Latest Ref Range: <200 mg/dL 205 (H)   HDL Cholesterol Latest Ref Range: >49 mg/dL 61   LDL Cholesterol Calculated Latest Ref Range: <100 mg/dL 121 (H)   Non HDL Cholesterol Latest Ref Range: <130 mg/dL 144 (H)   Triglycerides Latest Ref Range: <150 mg/dL 115       Follow up:   Rosemary FRANCO or Dr. Tai in 6 weeks       Please call my nurse Rosemarie at [phone] with any questions or concerns.    Scheduling phone number: [phone]  Reminder: Please bring in all current medications, over the counter supplements and vitamin bottles to your next appointment.          
yes

## 2020-08-31 ENCOUNTER — TRANSFERRED RECORDS (OUTPATIENT)
Dept: HEALTH INFORMATION MANAGEMENT | Facility: CLINIC | Age: 70
End: 2020-08-31

## 2020-08-31 ENCOUNTER — TELEPHONE (OUTPATIENT)
Dept: FAMILY MEDICINE | Facility: CLINIC | Age: 70
End: 2020-08-31

## 2020-08-31 NOTE — TELEPHONE ENCOUNTER
Reason for Call:  Form, our goal is to have forms completed with 72 hours, however, some forms may require a visit or additional information.    Type of letter, form or note:  medical    Who is the form from?: Patient    Where did the form come from: form was faxed in    What clinic location was the form placed at?: [facility]    Where the form was placed: [facility] Box/Folder    What number is listed as a contact on the form?: [phone]        Additional comments: Orthology: PT POC     Call taken on 8/31/2020 at 2:35 PM by Karen Pendleton

## 2020-09-23 ENCOUNTER — TRANSFERRED RECORDS (OUTPATIENT)
Dept: HEALTH INFORMATION MANAGEMENT | Facility: CLINIC | Age: 70
End: 2020-09-23

## 2020-10-21 ENCOUNTER — TRANSFERRED RECORDS (OUTPATIENT)
Dept: HEALTH INFORMATION MANAGEMENT | Facility: CLINIC | Age: 70
End: 2020-10-21

## 2020-10-21 ENCOUNTER — TELEPHONE (OUTPATIENT)
Dept: FAMILY MEDICINE | Facility: CLINIC | Age: 70
End: 2020-10-21

## 2020-10-21 NOTE — TELEPHONE ENCOUNTER
Reason for Call:  Form, our goal is to have forms completed with 72 hours, however, some forms may require a visit or additional information.    Type of letter, form or note:  medical    Who is the form from?: Home care    Where did the form come from: form was faxed in    What clinic location was the form placed at?: [facility]    Where the form was placed: [facility] Box/Folder    What number is listed as a contact on the form?: [phone] (P) [phone]       Additional comments: Orthology: PT Eval and POC     Call taken on 10/21/2020 at 10:52 AM by Karen Pendleton

## 2021-01-15 ENCOUNTER — HEALTH MAINTENANCE LETTER (OUTPATIENT)
Age: 71
End: 2021-01-15

## 2021-08-24 ENCOUNTER — DOCUMENTATION ONLY (OUTPATIENT)
Dept: CARDIOLOGY | Facility: CLINIC | Age: 71
End: 2021-08-24

## 2021-08-24 DIAGNOSIS — E78.5 HYPERLIPIDEMIA, UNSPECIFIED HYPERLIPIDEMIA TYPE: ICD-10-CM

## 2021-08-24 DIAGNOSIS — Z95.2 S/P AVR (AORTIC VALVE REPLACEMENT): Primary | ICD-10-CM

## 2021-08-24 NOTE — PROGRESS NOTES
Needs follow up echo hx of AVR/homograft per Dr. Friend  Also hx hyperlipidemia - lipid profile and alt needed per Dr. friend

## 2021-10-24 ENCOUNTER — HEALTH MAINTENANCE LETTER (OUTPATIENT)
Age: 71
End: 2021-10-24

## 2021-10-26 ENCOUNTER — LAB (OUTPATIENT)
Dept: LAB | Facility: CLINIC | Age: 71
End: 2021-10-26
Payer: MEDICARE

## 2021-10-26 ENCOUNTER — HOSPITAL ENCOUNTER (OUTPATIENT)
Dept: CARDIOLOGY | Facility: CLINIC | Age: 71
Discharge: HOME | End: 2021-10-26
Attending: INTERNAL MEDICINE | Admitting: INTERNAL MEDICINE
Payer: MEDICARE

## 2021-10-26 DIAGNOSIS — Z95.2 S/P AVR (AORTIC VALVE REPLACEMENT): ICD-10-CM

## 2021-10-26 DIAGNOSIS — E78.5 HYPERLIPIDEMIA, UNSPECIFIED HYPERLIPIDEMIA TYPE: ICD-10-CM

## 2021-10-26 LAB
ALT SERPL W P-5'-P-CCNC: 25 U/L (ref 0–50)
ANION GAP SERPL CALCULATED.3IONS-SCNC: 3 MMOL/L (ref 3–14)
BUN SERPL-MCNC: 22 MG/DL (ref 7–30)
CALCIUM SERPL-MCNC: 9 MG/DL (ref 8.5–10.1)
CHLORIDE BLD-SCNC: 108 MMOL/L (ref 94–109)
CHOLEST SERPL-MCNC: 163 MG/DL
CO2 SERPL-SCNC: 28 MMOL/L (ref 20–32)
CREAT SERPL-MCNC: 0.87 MG/DL (ref 0.52–1.04)
FASTING STATUS PATIENT QL REPORTED: YES
GFR SERPL CREATININE-BSD FRML MDRD: 68 ML/MIN/1.73M2
GLUCOSE BLD-MCNC: 98 MG/DL (ref 70–99)
HDLC SERPL-MCNC: 60 MG/DL
LDLC SERPL CALC-MCNC: 85 MG/DL
LVEF ECHO: NORMAL
NONHDLC SERPL-MCNC: 103 MG/DL
POTASSIUM BLD-SCNC: 4 MMOL/L (ref 3.4–5.3)
SODIUM SERPL-SCNC: 139 MMOL/L (ref 133–144)
TRIGL SERPL-MCNC: 91 MG/DL

## 2021-10-26 PROCEDURE — 80048 BASIC METABOLIC PNL TOTAL CA: CPT | Performed by: INTERNAL MEDICINE

## 2021-10-26 PROCEDURE — 36415 COLL VENOUS BLD VENIPUNCTURE: CPT | Performed by: INTERNAL MEDICINE

## 2021-10-26 PROCEDURE — 93306 TTE W/DOPPLER COMPLETE: CPT | Mod: 26 | Performed by: INTERNAL MEDICINE

## 2021-10-26 PROCEDURE — 93306 TTE W/DOPPLER COMPLETE: CPT

## 2021-10-26 PROCEDURE — 84460 ALANINE AMINO (ALT) (SGPT): CPT | Performed by: INTERNAL MEDICINE

## 2021-10-26 PROCEDURE — 80061 LIPID PANEL: CPT | Performed by: INTERNAL MEDICINE

## 2021-11-02 ENCOUNTER — OFFICE VISIT (OUTPATIENT)
Dept: CARDIOLOGY | Facility: CLINIC | Age: 71
End: 2021-11-02
Payer: MEDICARE

## 2021-11-02 VITALS
DIASTOLIC BLOOD PRESSURE: 85 MMHG | SYSTOLIC BLOOD PRESSURE: 123 MMHG | WEIGHT: 211 LBS | BODY MASS INDEX: 33.91 KG/M2 | HEIGHT: 66 IN | HEART RATE: 85 BPM

## 2021-11-02 DIAGNOSIS — Z95.2 S/P AVR (AORTIC VALVE REPLACEMENT): Primary | ICD-10-CM

## 2021-11-02 PROCEDURE — 93000 ELECTROCARDIOGRAM COMPLETE: CPT | Performed by: INTERNAL MEDICINE

## 2021-11-02 PROCEDURE — 99214 OFFICE O/P EST MOD 30 MIN: CPT | Mod: 25 | Performed by: INTERNAL MEDICINE

## 2021-11-02 RX ORDER — OMEGA-3 FATTY ACIDS/FISH OIL 300-1000MG
600 CAPSULE ORAL EVERY 4 HOURS PRN
COMMUNITY

## 2021-11-02 RX ORDER — UBIDECARENONE 100 MG
100 CAPSULE ORAL DAILY
COMMUNITY

## 2021-11-02 RX ORDER — ROSUVASTATIN CALCIUM 10 MG/1
10 TABLET, COATED ORAL DAILY
COMMUNITY

## 2021-11-02 RX ORDER — BUPROPION HYDROCHLORIDE 150 MG/1
150 TABLET, EXTENDED RELEASE ORAL DAILY
COMMUNITY

## 2021-11-02 ASSESSMENT — MIFFLIN-ST. JEOR: SCORE: 1498.84

## 2021-11-02 NOTE — PROGRESS NOTES
HPI and Plan:   See dictation    Orders Placed This Encounter   Procedures     Follow-Up with Cardiologist     EKG 12-lead complete w/read - Clinics (performed today)     Echocardiogram Complete     Orders Placed This Encounter   Medications     rosuvastatin (CRESTOR) 10 MG tablet     Sig: Take 10 mg by mouth daily     buPROPion (WELLBUTRIN SR) 150 MG 12 hr tablet     Sig: Take 150 mg by mouth daily     Cholecalciferol (VITAMIN D-3) 125 MCG (5000 UT) TABS     Sig: Take by mouth daily     TURMERIC PO     Sig: Take by mouth daily     co-enzyme Q-10 100 MG CAPS capsule     Sig: Take 100 mg by mouth daily 300 mg daily     ibuprofen (ADVIL/MOTRIN) 200 MG capsule     Sig: Take 600 mg by mouth every 4 hours as needed for fever     There are no discontinued medications.      Encounter Diagnosis   Name Primary?     S/P AVR (aortic valve replacement) Yes       CURRENT MEDICATIONS:  Current Outpatient Medications   Medication Sig Dispense Refill     amphetamine-dextroamphetamine (ADDERALL XR) 30 MG per 24 hr capsule Take 60 mg by mouth daily       buPROPion (WELLBUTRIN SR) 150 MG 12 hr tablet Take 150 mg by mouth daily       Cholecalciferol (VITAMIN D-3) 125 MCG (5000 UT) TABS Take by mouth daily       co-enzyme Q-10 100 MG CAPS capsule Take 100 mg by mouth daily 300 mg daily       ibuprofen (ADVIL/MOTRIN) 200 MG capsule Take 600 mg by mouth every 4 hours as needed for fever       Multiple Vitamin (DAILY MULTIVITAMIN PO) Take 1 tablet by mouth daily       rosuvastatin (CRESTOR) 10 MG tablet Take 10 mg by mouth daily       TURMERIC PO Take by mouth daily       aspirin 81 MG tablet Take 1 tablet by mouth daily.       FLUoxetine (PROZAC) 10 MG capsule 30 mg in am       valACYclovir (VALTREX) 1000 mg tablet Take 1 tablet (1,000 mg) by mouth as needed 30 tablet 0       ALLERGIES     Allergies   Allergen Reactions     Metformin GI Disturbance       PAST MEDICAL HISTORY:  Past Medical History:   Diagnosis Date     Aortic valve  disease 2003    Bicuspid AoV--->bioprosthetic valve, composite graft for aortic valve, and acsending aorta     Attention deficit disorder      Depression      Diverticulosis      Edema      Hyperlipidemia      Insulin resistance      Menopausal and perimenopausal disorder      Palpitations      Snoring      Thoracic aneurysm without mention of rupture 05/20/2003     Wart        PAST SURGICAL HISTORY:  Past Surgical History:   Procedure Laterality Date     CORONARY ANGIOGRAPHY ADULT ORDER  4/9/03    LEFT DOMINANT CORONARY SYSTEM WITH ESSENTIALLY NORMAL CORONARY     ENDOVASCULAR REPAIR ANEURYSM THORACIC AORTIC  5-03    Bicuspid Aortic valve with Ascending aortic aneurysm, post status replacement aortic valve and ascending aorta with composite homograft and reimplantation of her coronary arteries     LASIK       MAMMOPLASTY REDUCTION       ORTHOPEDIC SURGERY Left 2011    Lt knee arthroscopy      TONSILLECTOMY       TUBAL LIGATION         FAMILY HISTORY:  Family History   Problem Relation Age of Onset     Respiratory Father      Coronary Artery Disease No family hx of        SOCIAL HISTORY:  Social History     Socioeconomic History     Marital status:      Spouse name: None     Number of children: None     Years of education: None     Highest education level: None   Occupational History     None   Tobacco Use     Smoking status: Former Smoker     Types: Cigarettes     Smokeless tobacco: Never Used     Tobacco comment: stopped on/off   Substance and Sexual Activity     Alcohol use: Yes     Comment: 1 per day     Drug use: No     Sexual activity: Never     Partners: Male   Other Topics Concern     Parent/sibling w/ CABG, MI or angioplasty before 65F 55M? No   Social History Narrative     None     Social Determinants of Health     Financial Resource Strain:      Difficulty of Paying Living Expenses:    Food Insecurity:      Worried About Running Out of Food in the Last Year:      Ran Out of Food in the Last Year:   "  Transportation Needs:      Lack of Transportation (Medical):      Lack of Transportation (Non-Medical):    Physical Activity:      Days of Exercise per Week:      Minutes of Exercise per Session:    Stress:      Feeling of Stress :    Social Connections:      Frequency of Communication with Friends and Family:      Frequency of Social Gatherings with Friends and Family:      Attends Hoahaoism Services:      Active Member of Clubs or Organizations:      Attends Club or Organization Meetings:      Marital Status:    Intimate Partner Violence:      Fear of Current or Ex-Partner:      Emotionally Abused:      Physically Abused:      Sexually Abused:        Review of Systems:  Skin:  Negative     Eyes:  Positive for glasses  ENT:  Negative    Respiratory:  Positive for cough  Cardiovascular:  palpitations;chest pain;dizziness;lightheadedness;exercise intolerance;cyanosis;syncope or near-syncope;edema;fatigue;Negative for Positive for  Gastroenterology: Positive for heartburn  Genitourinary:  not assessed    Musculoskeletal:  Positive for joint stiffness  Neurologic:  Negative headaches;migraine headaches  Psychiatric:  Positive for depression  Heme/Lymph/Imm:  Positive for allergies  Endocrine:  Negative      Physical Exam:  Vitals: /85   Pulse 85   Ht 1.676 m (5' 6\")   Wt 95.7 kg (211 lb)   BMI 34.06 kg/m      Constitutional:  cooperative, alert and oriented, well developed, well nourished, in no acute distress        Skin:  warm and dry to the touch, no apparent skin lesions or masses noted          Head:  normocephalic, no masses or lesions        Eyes:  pupils equal and round, conjunctivae and lids unremarkable, sclera white, no xanthalasma, EOMS intact, no nystagmus        Lymph:No Cervical lymphadenopathy present;No thyromegaly     ENT:  no pallor or cyanosis, dentition good        Neck:  carotid pulses are full and equal bilaterally, JVP normal, no carotid bruit        Respiratory:  normal breath " sounds, clear to auscultation, normal A-P diameter, normal symmetry, normal respiratory excursion, no use of accessory muscles         Cardiac: regular rhythm       systolic ejection murmur;grade 1        pulses full and equal, no bruits auscultated                                        GI:  abdomen soft, non-tender, BS normoactive, no mass, no HSM, no bruits obese      Extremities and Muscular Skeletal:  no deformities, clubbing, cyanosis, erythema observed;no edema              Neurological:  no gross motor deficits        Psych:  Alert and Oriented x 3      Recent Lab Results:  LIPID RESULTS:  Lab Results   Component Value Date    CHOL 163 10/26/2021    CHOL 249 (H) 10/27/2017    HDL 60 10/26/2021    HDL 55 10/27/2017    LDL 85 10/26/2021     (H) 10/27/2017    TRIG 91 10/26/2021    TRIG 143 10/27/2017       LIVER ENZYME RESULTS:  Lab Results   Component Value Date    AST 17 10/27/2017    ALT 25 10/26/2021    ALT 28 10/27/2017       CBC RESULTS:  Lab Results   Component Value Date    WBC 4.5 09/30/2016    RBC 4.58 09/30/2016    HGB 13.8 09/30/2016    HCT 41.8 09/30/2016    MCV 91 09/30/2016    MCH 30.1 09/30/2016    MCHC 33.0 09/30/2016    RDW 12.4 09/30/2016     09/30/2016       BMP RESULTS:  Lab Results   Component Value Date     10/26/2021     10/27/2017    POTASSIUM 4.0 10/26/2021    POTASSIUM 4.1 10/27/2017    CHLORIDE 108 10/26/2021    CHLORIDE 108 10/27/2017    CO2 28 10/26/2021    CO2 24 10/27/2017    ANIONGAP 3 10/26/2021    ANIONGAP 10 10/27/2017    GLC 98 10/26/2021     (H) 10/27/2017    BUN 22 10/26/2021    BUN 24 10/27/2017    CR 0.87 10/26/2021    CR 0.80 10/27/2017    GFRESTIMATED 68 10/26/2021    GFRESTIMATED 72 10/27/2017    GFRESTBLACK 88 10/27/2017    MANPREET 9.0 10/26/2021    MANPREET 8.9 10/27/2017        A1C RESULTS:  Lab Results   Component Value Date    A1C 5.4 12/20/2013       INR RESULTS:  No results found for: INR        CC  No referring provider defined for this  encounter.

## 2021-11-02 NOTE — LETTER
11/2/2021    Zulema Leo MD  [facility] [address]  [facility] [address]    RE: Glendy Saravia       Dear Colleague,    I had the pleasure of seeing Glendy Saravia in the [facility].    HPI and Plan:   See dictation    Orders Placed This Encounter   Procedures     Follow-Up with Cardiologist     EKG 12-lead complete w/read - Clinics (performed today)     Echocardiogram Complete     Orders Placed This Encounter   Medications     rosuvastatin (CRESTOR) 10 MG tablet     Sig: Take 10 mg by mouth daily     buPROPion (WELLBUTRIN SR) 150 MG 12 hr tablet     Sig: Take 150 mg by mouth daily     Cholecalciferol (VITAMIN D-3) 125 MCG (5000 UT) TABS     Sig: Take by mouth daily     TURMERIC PO     Sig: Take by mouth daily     co-enzyme Q-10 100 MG CAPS capsule     Sig: Take 100 mg by mouth daily 300 mg daily     ibuprofen (ADVIL/MOTRIN) 200 MG capsule     Sig: Take 600 mg by mouth every 4 hours as needed for fever     There are no discontinued medications.      Encounter Diagnosis   Name Primary?     S/P AVR (aortic valve replacement) Yes       CURRENT MEDICATIONS:  Current Outpatient Medications   Medication Sig Dispense Refill     amphetamine-dextroamphetamine (ADDERALL XR) 30 MG per 24 hr capsule Take 60 mg by mouth daily       buPROPion (WELLBUTRIN SR) 150 MG 12 hr tablet Take 150 mg by mouth daily       Cholecalciferol (VITAMIN D-3) 125 MCG (5000 UT) TABS Take by mouth daily       co-enzyme Q-10 100 MG CAPS capsule Take 100 mg by mouth daily 300 mg daily       ibuprofen (ADVIL/MOTRIN) 200 MG capsule Take 600 mg by mouth every 4 hours as needed for fever       Multiple Vitamin (DAILY MULTIVITAMIN PO) Take 1 tablet by mouth daily       rosuvastatin (CRESTOR) 10 MG tablet Take 10 mg by mouth daily       TURMERIC PO Take by mouth daily       aspirin 81 MG tablet Take 1 tablet by mouth daily.       FLUoxetine (PROZAC) 10 MG capsule 30 mg in  am       valACYclovir (VALTREX) 1000 mg tablet Take 1 tablet (1,000 mg) by mouth as needed 30 tablet 0       ALLERGIES     Allergies   Allergen Reactions     Metformin GI Disturbance       PAST MEDICAL HISTORY:  Past Medical History:   Diagnosis Date     Aortic valve disease 2003    Bicuspid AoV--->bioprosthetic valve, composite graft for aortic valve, and acsending aorta     Attention deficit disorder      Depression      Diverticulosis      Edema      Hyperlipidemia      Insulin resistance      Menopausal and perimenopausal disorder      Palpitations      Snoring      Thoracic aneurysm without mention of rupture 05/20/2003     Wart        PAST SURGICAL HISTORY:  Past Surgical History:   Procedure Laterality Date     CORONARY ANGIOGRAPHY ADULT ORDER  4/9/03    LEFT DOMINANT CORONARY SYSTEM WITH ESSENTIALLY NORMAL CORONARY     ENDOVASCULAR REPAIR ANEURYSM THORACIC AORTIC  5-03    Bicuspid Aortic valve with Ascending aortic aneurysm, post status replacement aortic valve and ascending aorta with composite homograft and reimplantation of her coronary arteries     LASIK       MAMMOPLASTY REDUCTION       ORTHOPEDIC SURGERY Left 2011    Lt knee arthroscopy      TONSILLECTOMY       TUBAL LIGATION         FAMILY HISTORY:  Family History   Problem Relation Age of Onset     Respiratory Father      Coronary Artery Disease No family hx of        SOCIAL HISTORY:  Social History     Socioeconomic History     Marital status:      Spouse name: None     Number of children: None     Years of education: None     Highest education level: None   Occupational History     None   Tobacco Use     Smoking status: Former Smoker     Types: Cigarettes     Smokeless tobacco: Never Used     Tobacco comment: stopped on/off   Substance and Sexual Activity     Alcohol use: Yes     Comment: 1 per day     Drug use: No     Sexual activity: Never     Partners: Male   Other Topics Concern     Parent/sibling w/ CABG, MI or angioplasty before 65F  "55M? No   Social History Narrative     None     Social Determinants of Health     Financial Resource Strain:      Difficulty of Paying Living Expenses:    Food Insecurity:      Worried About Running Out of Food in the Last Year:      Ran Out of Food in the Last Year:    Transportation Needs:      Lack of Transportation (Medical):      Lack of Transportation (Non-Medical):    Physical Activity:      Days of Exercise per Week:      Minutes of Exercise per Session:    Stress:      Feeling of Stress :    Social Connections:      Frequency of Communication with Friends and Family:      Frequency of Social Gatherings with Friends and Family:      Attends Druze Services:      Active Member of Clubs or Organizations:      Attends Club or Organization Meetings:      Marital Status:    Intimate Partner Violence:      Fear of Current or Ex-Partner:      Emotionally Abused:      Physically Abused:      Sexually Abused:        Review of Systems:  Skin:  Negative     Eyes:  Positive for glasses  ENT:  Negative    Respiratory:  Positive for cough  Cardiovascular:  palpitations;chest pain;dizziness;lightheadedness;exercise intolerance;cyanosis;syncope or near-syncope;edema;fatigue;Negative for Positive for  Gastroenterology: Positive for heartburn  Genitourinary:  not assessed    Musculoskeletal:  Positive for joint stiffness  Neurologic:  Negative headaches;migraine headaches  Psychiatric:  Positive for depression  Heme/Lymph/Imm:  Positive for allergies  Endocrine:  Negative      Physical Exam:  Vitals: /85   Pulse 85   Ht 1.676 m (5' 6\")   Wt 95.7 kg (211 lb)   BMI 34.06 kg/m      Constitutional:  cooperative, alert and oriented, well developed, well nourished, in no acute distress        Skin:  warm and dry to the touch, no apparent skin lesions or masses noted          Head:  normocephalic, no masses or lesions        Eyes:  pupils equal and round, conjunctivae and lids unremarkable, sclera white, no xanthalasma, " EOMS intact, no nystagmus        Lymph:No Cervical lymphadenopathy present;No thyromegaly     ENT:  no pallor or cyanosis, dentition good        Neck:  carotid pulses are full and equal bilaterally, JVP normal, no carotid bruit        Respiratory:  normal breath sounds, clear to auscultation, normal A-P diameter, normal symmetry, normal respiratory excursion, no use of accessory muscles         Cardiac: regular rhythm       systolic ejection murmur;grade 1        pulses full and equal, no bruits auscultated                                        GI:  abdomen soft, non-tender, BS normoactive, no mass, no HSM, no bruits obese      Extremities and Muscular Skeletal:  no deformities, clubbing, cyanosis, erythema observed;no edema              Neurological:  no gross motor deficits        Psych:  Alert and Oriented x 3      Recent Lab Results:  LIPID RESULTS:  Lab Results   Component Value Date    CHOL 163 10/26/2021    CHOL 249 (H) 10/27/2017    HDL 60 10/26/2021    HDL 55 10/27/2017    LDL 85 10/26/2021     (H) 10/27/2017    TRIG 91 10/26/2021    TRIG 143 10/27/2017       LIVER ENZYME RESULTS:  Lab Results   Component Value Date    AST 17 10/27/2017    ALT 25 10/26/2021    ALT 28 10/27/2017       CBC RESULTS:  Lab Results   Component Value Date    WBC 4.5 09/30/2016    RBC 4.58 09/30/2016    HGB 13.8 09/30/2016    HCT 41.8 09/30/2016    MCV 91 09/30/2016    MCH 30.1 09/30/2016    MCHC 33.0 09/30/2016    RDW 12.4 09/30/2016     09/30/2016       BMP RESULTS:  Lab Results   Component Value Date     10/26/2021     10/27/2017    POTASSIUM 4.0 10/26/2021    POTASSIUM 4.1 10/27/2017    CHLORIDE 108 10/26/2021    CHLORIDE 108 10/27/2017    CO2 28 10/26/2021    CO2 24 10/27/2017    ANIONGAP 3 10/26/2021    ANIONGAP 10 10/27/2017    GLC 98 10/26/2021     (H) 10/27/2017    BUN 22 10/26/2021    BUN 24 10/27/2017    CR 0.87 10/26/2021    CR 0.80 10/27/2017    GFRESTIMATED 68 10/26/2021     GFRESTIMATED 72 10/27/2017    GFRESTBLACK 88 10/27/2017    MANPREET 9.0 10/26/2021    MANPREET 8.9 10/27/2017        A1C RESULTS:  Lab Results   Component Value Date    A1C 5.4 12/20/2013       INR RESULTS:  No results found for: INR        CC  No referring provider defined for this encounter.      Thank you for allowing me to participate in the care of your patient.      Sincerely,     Romain Tai MD     [facility]  cc:   No referring provider defined for this encounter.

## 2021-11-02 NOTE — PROGRESS NOTES
Service Date: 2021    CARDIOLOGY OFFICE NOTE    OFFICE NOTE:  Lindsay Gordon returns for followup.  I have not seen her for a number of years.  She was supposed to come back a few years ago, but somehow got out of sequence.  She had a bicuspid aortic valve with an ascending aortic aneurysm.  Dr. Green did a human homograft with it, and it has been working well.  She reports no cardiac symptoms, no chest pain, no palpitations, which she used to have, no shortness of breath, etc.  Today we reviewed her current echo, and we actually went back to several other echoes.  It is interesting that the aortic valve gradient, which was only mild for the last several echoes, is actually, if anything, less this time, and her blood pressure is in the normal range.  Her LV shows LVH, and I explained to her that that may be left over from either previous high blood pressure or from the previous aortic valve stenosis, but the ejection fraction is normal, and the ascending aorta, including the arch, is normal in size past the homograft. The valve overall is working well.  We did review some of the other blood work that was already done by Dr. Leo, including an electrolyte and lipid panel done last month, which is at goal.  Blood sugar is at goal.  EKG shows sinus rhythm and no LVH.  At this time, she is doing well.  I am going to see her back in 2 years, and we will do a repeat echo at that time.  At the time of her heart valve surgery, she did not have any coronary artery disease, and she reports no chest pain problems, so I do not think a stress test is indicated at this time.    Today's visit is 35 minutes.    cc:  [place] [address]    Romain Tai MD        D: 2021   T: 2021   MT: [email or web address]    Name:     LINDSAY GORDON  MRN:      [id number]        Account:      [id number]   :      1950           Service Date: 2021        Document: [id number]

## 2021-11-02 NOTE — LETTER
11/2/2021       RE: Glendy Saravia  [address] [place] [address]     Dear Colleague,    Thank you for referring your patient, Glendy Saravia, to the [facility] at [facility]. Please see a copy of my visit note below.    HPI and Plan:   See dictation    Orders Placed This Encounter   Procedures     Follow-Up with Cardiologist     EKG 12-lead complete w/read - Clinics (performed today)     Echocardiogram Complete     Orders Placed This Encounter   Medications     rosuvastatin (CRESTOR) 10 MG tablet     Sig: Take 10 mg by mouth daily     buPROPion (WELLBUTRIN SR) 150 MG 12 hr tablet     Sig: Take 150 mg by mouth daily     Cholecalciferol (VITAMIN D-3) 125 MCG (5000 UT) TABS     Sig: Take by mouth daily     TURMERIC PO     Sig: Take by mouth daily     co-enzyme Q-10 100 MG CAPS capsule     Sig: Take 100 mg by mouth daily 300 mg daily     ibuprofen (ADVIL/MOTRIN) 200 MG capsule     Sig: Take 600 mg by mouth every 4 hours as needed for fever     There are no discontinued medications.      Encounter Diagnosis   Name Primary?     S/P AVR (aortic valve replacement) Yes       CURRENT MEDICATIONS:  Current Outpatient Medications   Medication Sig Dispense Refill     amphetamine-dextroamphetamine (ADDERALL XR) 30 MG per 24 hr capsule Take 60 mg by mouth daily       buPROPion (WELLBUTRIN SR) 150 MG 12 hr tablet Take 150 mg by mouth daily       Cholecalciferol (VITAMIN D-3) 125 MCG (5000 UT) TABS Take by mouth daily       co-enzyme Q-10 100 MG CAPS capsule Take 100 mg by mouth daily 300 mg daily       ibuprofen (ADVIL/MOTRIN) 200 MG capsule Take 600 mg by mouth every 4 hours as needed for fever       Multiple Vitamin (DAILY MULTIVITAMIN PO) Take 1 tablet by mouth daily       rosuvastatin (CRESTOR) 10 MG tablet Take 10 mg by mouth daily       TURMERIC PO Take by mouth daily       aspirin 81 MG tablet Take 1 tablet by mouth daily.       FLUoxetine  (PROZAC) 10 MG capsule 30 mg in am       valACYclovir (VALTREX) 1000 mg tablet Take 1 tablet (1,000 mg) by mouth as needed 30 tablet 0       ALLERGIES     Allergies   Allergen Reactions     Metformin GI Disturbance       PAST MEDICAL HISTORY:  Past Medical History:   Diagnosis Date     Aortic valve disease 2003    Bicuspid AoV--->bioprosthetic valve, composite graft for aortic valve, and acsending aorta     Attention deficit disorder      Depression      Diverticulosis      Edema      Hyperlipidemia      Insulin resistance      Menopausal and perimenopausal disorder      Palpitations      Snoring      Thoracic aneurysm without mention of rupture 05/20/2003     Wart        PAST SURGICAL HISTORY:  Past Surgical History:   Procedure Laterality Date     CORONARY ANGIOGRAPHY ADULT ORDER  4/9/03    LEFT DOMINANT CORONARY SYSTEM WITH ESSENTIALLY NORMAL CORONARY     ENDOVASCULAR REPAIR ANEURYSM THORACIC AORTIC  5-03    Bicuspid Aortic valve with Ascending aortic aneurysm, post status replacement aortic valve and ascending aorta with composite homograft and reimplantation of her coronary arteries     LASIK       MAMMOPLASTY REDUCTION       ORTHOPEDIC SURGERY Left 2011    Lt knee arthroscopy      TONSILLECTOMY       TUBAL LIGATION         FAMILY HISTORY:  Family History   Problem Relation Age of Onset     Respiratory Father      Coronary Artery Disease No family hx of        SOCIAL HISTORY:  Social History     Socioeconomic History     Marital status:      Spouse name: None     Number of children: None     Years of education: None     Highest education level: None   Occupational History     None   Tobacco Use     Smoking status: Former Smoker     Types: Cigarettes     Smokeless tobacco: Never Used     Tobacco comment: stopped on/off   Substance and Sexual Activity     Alcohol use: Yes     Comment: 1 per day     Drug use: No     Sexual activity: Never     Partners: Male   Other Topics Concern     Parent/sibling w/  "CABG, MI or angioplasty before 65F 55M? No   Social History Narrative     None     Social Determinants of Health     Financial Resource Strain:      Difficulty of Paying Living Expenses:    Food Insecurity:      Worried About Running Out of Food in the Last Year:      Ran Out of Food in the Last Year:    Transportation Needs:      Lack of Transportation (Medical):      Lack of Transportation (Non-Medical):    Physical Activity:      Days of Exercise per Week:      Minutes of Exercise per Session:    Stress:      Feeling of Stress :    Social Connections:      Frequency of Communication with Friends and Family:      Frequency of Social Gatherings with Friends and Family:      Attends Sabianist Services:      Active Member of Clubs or Organizations:      Attends Club or Organization Meetings:      Marital Status:    Intimate Partner Violence:      Fear of Current or Ex-Partner:      Emotionally Abused:      Physically Abused:      Sexually Abused:        Review of Systems:  Skin:  Negative     Eyes:  Positive for glasses  ENT:  Negative    Respiratory:  Positive for cough  Cardiovascular:  palpitations;chest pain;dizziness;lightheadedness;exercise intolerance;cyanosis;syncope or near-syncope;edema;fatigue;Negative for Positive for  Gastroenterology: Positive for heartburn  Genitourinary:  not assessed    Musculoskeletal:  Positive for joint stiffness  Neurologic:  Negative headaches;migraine headaches  Psychiatric:  Positive for depression  Heme/Lymph/Imm:  Positive for allergies  Endocrine:  Negative      Physical Exam:  Vitals: /85   Pulse 85   Ht 1.676 m (5' 6\")   Wt 95.7 kg (211 lb)   BMI 34.06 kg/m      Constitutional:  cooperative, alert and oriented, well developed, well nourished, in no acute distress        Skin:  warm and dry to the touch, no apparent skin lesions or masses noted          Head:  normocephalic, no masses or lesions        Eyes:  pupils equal and round, conjunctivae and lids " unremarkable, sclera white, no xanthalasma, EOMS intact, no nystagmus        Lymph:No Cervical lymphadenopathy present;No thyromegaly     ENT:  no pallor or cyanosis, dentition good        Neck:  carotid pulses are full and equal bilaterally, JVP normal, no carotid bruit        Respiratory:  normal breath sounds, clear to auscultation, normal A-P diameter, normal symmetry, normal respiratory excursion, no use of accessory muscles         Cardiac: regular rhythm       systolic ejection murmur;grade 1        pulses full and equal, no bruits auscultated                                        GI:  abdomen soft, non-tender, BS normoactive, no mass, no HSM, no bruits obese      Extremities and Muscular Skeletal:  no deformities, clubbing, cyanosis, erythema observed;no edema              Neurological:  no gross motor deficits        Psych:  Alert and Oriented x 3      Recent Lab Results:  LIPID RESULTS:  Lab Results   Component Value Date    CHOL 163 10/26/2021    CHOL 249 (H) 10/27/2017    HDL 60 10/26/2021    HDL 55 10/27/2017    LDL 85 10/26/2021     (H) 10/27/2017    TRIG 91 10/26/2021    TRIG 143 10/27/2017       LIVER ENZYME RESULTS:  Lab Results   Component Value Date    AST 17 10/27/2017    ALT 25 10/26/2021    ALT 28 10/27/2017       CBC RESULTS:  Lab Results   Component Value Date    WBC 4.5 09/30/2016    RBC 4.58 09/30/2016    HGB 13.8 09/30/2016    HCT 41.8 09/30/2016    MCV 91 09/30/2016    MCH 30.1 09/30/2016    MCHC 33.0 09/30/2016    RDW 12.4 09/30/2016     09/30/2016       BMP RESULTS:  Lab Results   Component Value Date     10/26/2021     10/27/2017    POTASSIUM 4.0 10/26/2021    POTASSIUM 4.1 10/27/2017    CHLORIDE 108 10/26/2021    CHLORIDE 108 10/27/2017    CO2 28 10/26/2021    CO2 24 10/27/2017    ANIONGAP 3 10/26/2021    ANIONGAP 10 10/27/2017    GLC 98 10/26/2021     (H) 10/27/2017    BUN 22 10/26/2021    BUN 24 10/27/2017    CR 0.87 10/26/2021    CR 0.80 10/27/2017     GFRESTIMATED 68 10/26/2021    GFRESTIMATED 72 10/27/2017    GFRESTBLACK 88 10/27/2017    MANPREET 9.0 10/26/2021    MANPREET 8.9 10/27/2017        A1C RESULTS:  Lab Results   Component Value Date    A1C 5.4 12/20/2013       INR RESULTS:  No results found for: INR        CC  No referring provider defined for this encounter.    Service Date: 11/02/2021    CARDIOLOGY OFFICE NOTE    OFFICE NOTE:  Glendy Saravia returns for followup.  I have not seen her for a number of years.  She was supposed to come back a few years ago, but somehow got out of sequence.  She had a bicuspid aortic valve with an ascending aortic aneurysm.  Dr. Green did a human homograft with it, and it has been working well.  She reports no cardiac symptoms, no chest pain, no palpitations, which she used to have, no shortness of breath, etc.  Today we reviewed her current echo, and we actually went back to several other echoes.  It is interesting that the aortic valve gradient, which was only mild for the last several echoes, is actually, if anything, less this time, and her blood pressure is in the normal range.  Her LV shows LVH, and I explained to her that that may be left over from either previous high blood pressure or from the previous aortic valve stenosis, but the ejection fraction is normal, and the ascending aorta, including the arch, is normal in size past the homograft. The valve overall is working well.  We did review some of the other blood work that was already done by Dr. Leo, including an electrolyte and lipid panel done last month, which is at goal.  Blood sugar is at goal.  EKG shows sinus rhythm and no LVH.  At this time, she is doing well.  I am going to see her back in 2 years, and we will do a repeat echo at that time.  At the time of her heart valve surgery, she did not have any coronary artery disease, and she reports no chest pain problems, so I do not think a stress test is indicated at this time.    Today's visit is 35  minutes.    cc:  Zulema Leo MD   [address] [address]      Romain Tai MD        D: 2021   T: 2021   MT: roberto    Name:     LINDSAY GORDON  MRN:      [id number]        Account:      [id number]   :      1950           Service Date: 2021       Document: [id number]

## 2022-02-13 ENCOUNTER — HEALTH MAINTENANCE LETTER (OUTPATIENT)
Age: 72
End: 2022-02-13

## 2022-10-16 ENCOUNTER — HEALTH MAINTENANCE LETTER (OUTPATIENT)
Age: 72
End: 2022-10-16

## 2022-10-20 ENCOUNTER — TRANSFERRED RECORDS (OUTPATIENT)
Dept: HEALTH INFORMATION MANAGEMENT | Facility: CLINIC | Age: 72
End: 2022-10-20

## 2022-11-11 ENCOUNTER — TRANSFERRED RECORDS (OUTPATIENT)
Dept: HEALTH INFORMATION MANAGEMENT | Facility: CLINIC | Age: 72
End: 2022-11-11

## 2023-03-26 ENCOUNTER — HEALTH MAINTENANCE LETTER (OUTPATIENT)
Age: 73
End: 2023-03-26

## 2023-05-11 ENCOUNTER — TRANSFERRED RECORDS (OUTPATIENT)
Dept: HEALTH INFORMATION MANAGEMENT | Facility: CLINIC | Age: 73
End: 2023-05-11
Payer: MEDICARE

## 2023-11-03 ENCOUNTER — HOSPITAL ENCOUNTER (OUTPATIENT)
Dept: CARDIOLOGY | Facility: CLINIC | Age: 73
Discharge: HOME | End: 2023-11-03
Attending: INTERNAL MEDICINE | Admitting: INTERNAL MEDICINE
Payer: MEDICARE

## 2023-11-03 ENCOUNTER — TELEPHONE (OUTPATIENT)
Dept: CARDIOLOGY | Facility: CLINIC | Age: 73
End: 2023-11-03

## 2023-11-03 DIAGNOSIS — Z95.2 S/P AVR (AORTIC VALVE REPLACEMENT): ICD-10-CM

## 2023-11-03 LAB — LVEF ECHO: NORMAL

## 2023-11-03 PROCEDURE — 93306 TTE W/DOPPLER COMPLETE: CPT | Mod: 26 | Performed by: INTERNAL MEDICINE

## 2023-11-03 PROCEDURE — 93306 TTE W/DOPPLER COMPLETE: CPT

## 2023-11-03 NOTE — TELEPHONE ENCOUNTER
Pt last saw Dr. Tai on 11/2/21 and per the note:  Glendy Saravia returns for followup.  I have not seen her for a number of years.  She was supposed to come back a few years ago, but somehow got out of sequence.  She had a bicuspid aortic valve with an ascending aortic aneurysm.  Dr. Green did a human homograft with it, and it has been working well.  She reports no cardiac symptoms, no chest pain, no palpitations, which she used to have, no shortness of breath, etc.  Today we reviewed her current echo, and we actually went back to several other echoes.  It is interesting that the aortic valve gradient, which was only mild for the last several echoes, is actually, if anything, less this time, and her blood pressure is in the normal range.  Her LV shows LVH, and I explained to her that that may be left over from either previous high blood pressure or from the previous aortic valve stenosis, but the ejection fraction is normal, and the ascending aorta, including the arch, is normal in size past the homograft. The valve overall is working well.  We did review some of the other blood work that was already done by Dr. Leo, including an electrolyte and lipid panel done last month, which is at goal.  Blood sugar is at goal.  EKG shows sinus rhythm and no LVH.  At this time, she is doing well.  I am going to see her back in 2 years, and we will do a repeat echo at that time.  At the time of her heart valve surgery, she did not have any coronary artery disease, and she reports no chest pain problems, so I do not think a stress test is indicated at this time.    Pt had echo completed on 11/3/23:  Interpretation Summary  There is moderate concentric left ventricular hypertrophy.  There is trace to mild aortic regurgitation.  Prosthetic aortic mean gradient increased from 10/26/21 echo but similar to  echo 2/13/17--likely this mean gradient is normal for the patient/valve.  This is an ascending aorta homograft aortic  valve/aorta

## 2023-11-08 NOTE — TELEPHONE ENCOUNTER
Dr. Tai reviewed results and no recommendations at this time.   [employer] message sent to pt and follow up is scheduled for 2/8/24.

## 2024-02-08 ENCOUNTER — OFFICE VISIT (OUTPATIENT)
Dept: CARDIOLOGY | Facility: CLINIC | Age: 74
End: 2024-02-08
Payer: MEDICARE

## 2024-02-08 VITALS
SYSTOLIC BLOOD PRESSURE: 100 MMHG | DIASTOLIC BLOOD PRESSURE: 62 MMHG | WEIGHT: 211 LBS | HEIGHT: 66 IN | BODY MASS INDEX: 33.91 KG/M2 | HEART RATE: 81 BPM

## 2024-02-08 DIAGNOSIS — Z95.2 S/P AVR (AORTIC VALVE REPLACEMENT): Primary | ICD-10-CM

## 2024-02-08 PROCEDURE — 99214 OFFICE O/P EST MOD 30 MIN: CPT | Performed by: INTERNAL MEDICINE

## 2024-02-08 NOTE — PROGRESS NOTES
HPI and Plan:   I the pleasure of following up with Ms. Saravia.  She had a bicuspid aortic valve with aortopathy and had a Humatin homograft with Dr. Lockhart 21 years ago happy to report that she is feeling well no cardiac symptoms whatsoever.  She did have right breast cancer surgery since I saw her 2 and half years ago she is doing well with that we reviewed her echocardiogram today she has mild systolic murmur on exam and a mean gradient across the aortic valve 11 mmHg which is very similar to previous echoes there is trace AI resting heart function is normal.  We reviewed blood work from her internist including electrolytes which were normal lipids which show mild triglyceride elevation she does not have any known coronary disease liver tests do show essentially normal blood sugar is slightly high consistent with I FG.  We discussed diet and exercise I made no changes we will see her back in 2 years for repeat echocardiogram today's visit was 32    Orders Placed This Encounter   Procedures    Follow-Up with Cardiologist     No orders of the defined types were placed in this encounter.    There are no discontinued medications.      Encounter Diagnosis   Name Primary?    S/P AVR (aortic valve replacement) Yes       CURRENT MEDICATIONS:  Current Outpatient Medications   Medication Sig Dispense Refill    amphetamine-dextroamphetamine (ADDERALL XR) 30 MG per 24 hr capsule Take 60 mg by mouth daily      buPROPion (WELLBUTRIN SR) 150 MG 12 hr tablet Take 150 mg by mouth daily      Cholecalciferol (VITAMIN D-3) 125 MCG (5000 UT) TABS Take by mouth daily      co-enzyme Q-10 100 MG CAPS capsule Take 100 mg by mouth daily 300 mg daily      ibuprofen (ADVIL/MOTRIN) 200 MG capsule Take 600 mg by mouth every 4 hours as needed for fever      Multiple Vitamin (DAILY MULTIVITAMIN PO) Take 1 tablet by mouth daily      rosuvastatin (CRESTOR) 10 MG tablet Take 10 mg by mouth daily      TURMERIC PO Take by mouth daily       valACYclovir (VALTREX) 1000 mg tablet Take 1 tablet (1,000 mg) by mouth as needed 30 tablet 0    aspirin 81 MG tablet Take 1 tablet by mouth daily.      FLUoxetine (PROZAC) 10 MG capsule 30 mg in am         ALLERGIES     Allergies   Allergen Reactions    Metformin GI Disturbance       PAST MEDICAL HISTORY:  Past Medical History:   Diagnosis Date    Aortic valve disease 2003    Bicuspid AoV--->bioprosthetic valve, composite graft for aortic valve, and acsending aorta    Attention deficit disorder     Breast cancer (H) 2022    surgery and radiation seeds-R breast    Depression     Diverticulosis     Edema     Hyperlipidemia     IFG (impaired fasting glucose)     Insulin resistance     Menopausal and perimenopausal disorder     Palpitations     Snoring     Thoracic aneurysm without mention of rupture 05/20/2003    Wart        PAST SURGICAL HISTORY:  Past Surgical History:   Procedure Laterality Date    BREAST LUMPECTOMY, RT/LT  2022    right breast surg    CORONARY ANGIOGRAPHY ADULT ORDER  04/09/2003    LEFT DOMINANT CORONARY SYSTEM WITH ESSENTIALLY NORMAL CORONARY    ENDOVASCULAR REPAIR ANEURYSM THORACIC AORTIC  05/2003    Bicuspid Aortic valve with Ascending aortic aneurysm, post status replacement aortic valve and ascending aorta with composite homograft and reimplantation of her coronary arteries    LASIK      MAMMOPLASTY REDUCTION      ORTHOPEDIC SURGERY Left 2011    Lt knee arthroscopy     TONSILLECTOMY      TUBAL LIGATION         FAMILY HISTORY:  Family History   Problem Relation Age of Onset    Respiratory Father     Coronary Artery Disease No family hx of        SOCIAL HISTORY:  Social History     Socioeconomic History    Marital status:      Spouse name: None    Number of children: None    Years of education: None    Highest education level: None   Tobacco Use    Smoking status: Former     Types: Cigarettes    Smokeless tobacco: Never    Tobacco comments:     stopped on/off   Substance and Sexual  "Activity    Alcohol use: Yes     Comment: 1 per day    Drug use: No    Sexual activity: Never     Partners: Male   Other Topics Concern    Parent/sibling w/ CABG, MI or angioplasty before 65F 55M? No       Review of Systems:  Skin:        Eyes:       ENT:       Respiratory:  Negative    Cardiovascular:    edema;Positive for  Gastroenterology:      Genitourinary:       Musculoskeletal:       Neurologic:       Psychiatric:       Heme/Lymph/Imm:       Endocrine:         Physical Exam:  Vitals: /62   Pulse 81   Ht 1.676 m (5' 6\")   Wt 95.7 kg (211 lb)   BMI 34.06 kg/m        Constitutional:  cooperative, alert and oriented, well developed, well nourished, in no acute distress        Skin:  warm and dry to the touch, no apparent skin lesions or masses noted          Head:  normocephalic, no masses or lesions        Eyes:  pupils equal and round, conjunctivae and lids unremarkable, sclera white, no xanthalasma, EOMS intact, no nystagmus        Lymph:No Cervical lymphadenopathy present;No thyromegaly     ENT:           Neck:  carotid pulses are full and equal bilaterally, JVP normal, no carotid bruit        Respiratory:  normal breath sounds, clear to auscultation, normal A-P diameter, normal symmetry, normal respiratory excursion, no use of accessory muscles    prior breast surg    Cardiac: regular rhythm       early systolic murmur;grade 2        pulses full and equal, no bruits auscultated                                        GI:  abdomen soft, non-tender, BS normoactive, no mass, no HSM, no bruits        Extremities and Muscular Skeletal:  no deformities, clubbing, cyanosis, erythema observed;no edema              Neurological:  no gross motor deficits        Psych:  Alert and Oriented x 3      Recent Lab Results:  LIPID RESULTS:  Lab Results   Component Value Date    CHOL 163 10/26/2021    CHOL 249 (H) 10/27/2017    HDL 60 10/26/2021    HDL 55 10/27/2017    LDL 85 10/26/2021     (H) 10/27/2017    " "TRIG 91 10/26/2021    TRIG 143 10/27/2017       LIVER ENZYME RESULTS:  Lab Results   Component Value Date    AST 17 10/27/2017    ALT 25 10/26/2021    ALT 28 10/27/2017       CBC RESULTS:  Lab Results   Component Value Date    WBC 4.5 09/30/2016    RBC 4.58 09/30/2016    HGB 13.8 09/30/2016    HCT 41.8 09/30/2016    MCV 91 09/30/2016    MCH 30.1 09/30/2016    MCHC 33.0 09/30/2016    RDW 12.4 09/30/2016     09/30/2016       BMP RESULTS:  Lab Results   Component Value Date     10/26/2021     10/27/2017    POTASSIUM 4.0 10/26/2021    POTASSIUM 4.1 10/27/2017    CHLORIDE 108 10/26/2021    CHLORIDE 108 10/27/2017    CO2 28 10/26/2021    CO2 24 10/27/2017    ANIONGAP 3 10/26/2021    ANIONGAP 10 10/27/2017    GLC 98 10/26/2021     (H) 10/27/2017    BUN 22 10/26/2021    BUN 24 10/27/2017    CR 0.87 10/26/2021    CR 0.80 10/27/2017    GFRESTIMATED 68 10/26/2021    GFRESTIMATED 72 10/27/2017    GFRESTBLACK 88 10/27/2017    MANPREET 9.0 10/26/2021    MANPREET 8.9 10/27/2017        A1C RESULTS:  Lab Results   Component Value Date    A1C 5.4 12/20/2013       INR RESULTS:  No results found for: \"INR\"        CC  No referring provider defined for this encounter.  "

## 2024-02-08 NOTE — LETTER
2/8/2024    Zulema Leo MD  [facility] [address]  [place] [address]    RE: Glendy Saravia       Dear Colleague,     I had the pleasure of seeing Glendy Saravia in the [facility].  HPI and Plan:   I the pleasure of following up with Ms. Saravia.  She had a bicuspid aortic valve with aortopathy and had a [place] homograft with Dr. Lockhart 21 years ago happy to report that she is feeling well no cardiac symptoms whatsoever.  She did have right breast cancer surgery since I saw her 2 and half years ago she is doing well with that we reviewed her echocardiogram today she has mild systolic murmur on exam and a mean gradient across the aortic valve 11 mmHg which is very similar to previous echoes there is trace AI resting heart function is normal.  We reviewed blood work from her internist including electrolytes which were normal lipids which show mild triglyceride elevation she does not have any known coronary disease liver tests do show essentially normal blood sugar is slightly high consistent with I FG.  We discussed diet and exercise I made no changes we will see her back in 2 years for repeat echocardiogram today's visit was 32    Orders Placed This Encounter   Procedures    Follow-Up with Cardiologist     No orders of the defined types were placed in this encounter.    There are no discontinued medications.      Encounter Diagnosis   Name Primary?    S/P AVR (aortic valve replacement) Yes       CURRENT MEDICATIONS:  Current Outpatient Medications   Medication Sig Dispense Refill    amphetamine-dextroamphetamine (ADDERALL XR) 30 MG per 24 hr capsule Take 60 mg by mouth daily      buPROPion (WELLBUTRIN SR) 150 MG 12 hr tablet Take 150 mg by mouth daily      Cholecalciferol (VITAMIN D-3) 125 MCG (5000 UT) TABS Take by mouth daily      co-enzyme Q-10 100 MG CAPS capsule Take 100 mg by mouth daily 300 mg daily      ibuprofen (ADVIL/MOTRIN) 200 MG capsule Take 600 mg by  mouth every 4 hours as needed for fever      Multiple Vitamin (DAILY MULTIVITAMIN PO) Take 1 tablet by mouth daily      rosuvastatin (CRESTOR) 10 MG tablet Take 10 mg by mouth daily      TURMERIC PO Take by mouth daily      valACYclovir (VALTREX) 1000 mg tablet Take 1 tablet (1,000 mg) by mouth as needed 30 tablet 0    aspirin 81 MG tablet Take 1 tablet by mouth daily.      FLUoxetine (PROZAC) 10 MG capsule 30 mg in am         ALLERGIES     Allergies   Allergen Reactions    Metformin GI Disturbance       PAST MEDICAL HISTORY:  Past Medical History:   Diagnosis Date    Aortic valve disease 2003    Bicuspid AoV--->bioprosthetic valve, composite graft for aortic valve, and acsending aorta    Attention deficit disorder     Breast cancer (H) 2022    surgery and radiation seeds-R breast    Depression     Diverticulosis     Edema     Hyperlipidemia     IFG (impaired fasting glucose)     Insulin resistance     Menopausal and perimenopausal disorder     Palpitations     Snoring     Thoracic aneurysm without mention of rupture 05/20/2003    Wart        PAST SURGICAL HISTORY:  Past Surgical History:   Procedure Laterality Date    BREAST LUMPECTOMY, RT/LT  2022    right breast surg    CORONARY ANGIOGRAPHY ADULT ORDER  04/09/2003    LEFT DOMINANT CORONARY SYSTEM WITH ESSENTIALLY NORMAL CORONARY    ENDOVASCULAR REPAIR ANEURYSM THORACIC AORTIC  05/2003    Bicuspid Aortic valve with Ascending aortic aneurysm, post status replacement aortic valve and ascending aorta with composite homograft and reimplantation of her coronary arteries    LASIK      MAMMOPLASTY REDUCTION      ORTHOPEDIC SURGERY Left 2011    Lt knee arthroscopy     TONSILLECTOMY      TUBAL LIGATION         FAMILY HISTORY:  Family History   Problem Relation Age of Onset    Respiratory Father     Coronary Artery Disease No family hx of        SOCIAL HISTORY:  Social History     Socioeconomic History    Marital status:      Spouse name: None    Number of children:  "None    Years of education: None    Highest education level: None   Tobacco Use    Smoking status: Former     Types: Cigarettes    Smokeless tobacco: Never    Tobacco comments:     stopped on/off   Substance and Sexual Activity    Alcohol use: Yes     Comment: 1 per day    Drug use: No    Sexual activity: Never     Partners: Male   Other Topics Concern    Parent/sibling w/ CABG, MI or angioplasty before 65F 55M? No       Review of Systems:  Skin:        Eyes:       ENT:       Respiratory:  Negative    Cardiovascular:    edema;Positive for  Gastroenterology:      Genitourinary:       Musculoskeletal:       Neurologic:       Psychiatric:       Heme/Lymph/Imm:       Endocrine:         Physical Exam:  Vitals: /62   Pulse 81   Ht 1.676 m (5' 6\")   Wt 95.7 kg (211 lb)   BMI 34.06 kg/m        Constitutional:  cooperative, alert and oriented, well developed, well nourished, in no acute distress        Skin:  warm and dry to the touch, no apparent skin lesions or masses noted          Head:  normocephalic, no masses or lesions        Eyes:  pupils equal and round, conjunctivae and lids unremarkable, sclera white, no xanthalasma, EOMS intact, no nystagmus        Lymph:No Cervical lymphadenopathy present;No thyromegaly     ENT:           Neck:  carotid pulses are full and equal bilaterally, JVP normal, no carotid bruit        Respiratory:  normal breath sounds, clear to auscultation, normal A-P diameter, normal symmetry, normal respiratory excursion, no use of accessory muscles    prior breast surg    Cardiac: regular rhythm       early systolic murmur;grade 2        pulses full and equal, no bruits auscultated                                        GI:  abdomen soft, non-tender, BS normoactive, no mass, no HSM, no bruits        Extremities and Muscular Skeletal:  no deformities, clubbing, cyanosis, erythema observed;no edema              Neurological:  no gross motor deficits        Psych:  Alert and Oriented x 3  " "    Recent Lab Results:  LIPID RESULTS:  Lab Results   Component Value Date    CHOL 163 10/26/2021    CHOL 249 (H) 10/27/2017    HDL 60 10/26/2021    HDL 55 10/27/2017    LDL 85 10/26/2021     (H) 10/27/2017    TRIG 91 10/26/2021    TRIG 143 10/27/2017       LIVER ENZYME RESULTS:  Lab Results   Component Value Date    AST 17 10/27/2017    ALT 25 10/26/2021    ALT 28 10/27/2017       CBC RESULTS:  Lab Results   Component Value Date    WBC 4.5 09/30/2016    RBC 4.58 09/30/2016    HGB 13.8 09/30/2016    HCT 41.8 09/30/2016    MCV 91 09/30/2016    MCH 30.1 09/30/2016    MCHC 33.0 09/30/2016    RDW 12.4 09/30/2016     09/30/2016       BMP RESULTS:  Lab Results   Component Value Date     10/26/2021     10/27/2017    POTASSIUM 4.0 10/26/2021    POTASSIUM 4.1 10/27/2017    CHLORIDE 108 10/26/2021    CHLORIDE 108 10/27/2017    CO2 28 10/26/2021    CO2 24 10/27/2017    ANIONGAP 3 10/26/2021    ANIONGAP 10 10/27/2017    GLC 98 10/26/2021     (H) 10/27/2017    BUN 22 10/26/2021    BUN 24 10/27/2017    CR 0.87 10/26/2021    CR 0.80 10/27/2017    GFRESTIMATED 68 10/26/2021    GFRESTIMATED 72 10/27/2017    GFRESTBLACK 88 10/27/2017    MANPREET 9.0 10/26/2021    MANPREET 8.9 10/27/2017        A1C RESULTS:  Lab Results   Component Value Date    A1C 5.4 12/20/2013       INR RESULTS:  No results found for: \"INR\"        CC  No referring provider defined for this encounter.      Thank you for allowing me to participate in the care of your patient.      Sincerely,     Romain Tai MD     [facility]  "

## 2024-06-01 ENCOUNTER — HEALTH MAINTENANCE LETTER (OUTPATIENT)
Age: 74
End: 2024-06-01

## 2024-10-19 ENCOUNTER — HEALTH MAINTENANCE LETTER (OUTPATIENT)
Age: 74
End: 2024-10-19

## 2025-03-31 ENCOUNTER — TRANSFERRED RECORDS (OUTPATIENT)
Dept: ADMINISTRATIVE | Facility: CLINIC | Age: 75
End: 2025-03-31
Payer: MEDICARE

## 2025-04-01 NOTE — PROCEDURES
[facility]   [address] [place] [address], [address], [place] [address]     Patient Name: Glendy Saravia  Gender:  Female  Exam Date: 03/31/2025 Visit Number:  [id number]  Age: 74 Years YOB: 1950  Attending MD: Diony Narvaez MD Medical Record#:  [id number]    Procedure: Colonoscopy   Indications: Previous adenomatous polyp(s)      Referring MD: Referral Self  Primary MD:      Zulema Leo MD  Medications: Admitting Medications:   0.9% Normal Saline at TKO   Intra Procedure Medications:   Patient received monitored anesthesia care.     Complications: No immediate complications  ______________________________________________________________________________  Procedure:   An examination of the heart and lungs was performed and found to be within acceptable limits.  .  The patient was therefore deemed a reasonable candidate for endoscopy and sedation.   The risks and benefits of the procedure were explained to the patient.After obtaining informed consent, the patient received monitored anesthesia care and I passed the scope   without difficulty via the rectum to the cecum.  The appendiceal orifice and ic valve were identified.  The quality of the prep was good  (Miralax/Gatorade/2 tablets Bisacodyl/Magnesium Citrate).    This was a complete examination throughout the entire colon.    Findings:    Polyp location: cecum.  Quantity: 1.  Size: 3 mm.  Polyp shape:  sessile.         Maneuver: polypectomy was performed with a cold snare.       Removal:  complete.  Retrieval: complete.  Bleeding: none.    Polyp location: ascending colon.  Quantity: 4.  Size:  1 mm, 1 mm, 2 mm, 3 mm.  Polyp shape: sessile.         Maneuver: polypectomy was performed with a  cold snare.       Removal: complete.  Retrieval: complete.  Bleeding: none.    Diverticulosis.  Location: - descending colon - sigmoid.        Quantity:  many.  No inflammation present.  Remainder of the exam is  normal.    Impression:  Colorectal polyps  Diverticulosis of colon without diverticulitis  History of adenomatous polyp of colon    Preliminary Plan:  The patient and their physician will receive a copy of the pathology report as well as pathology-based recommendations for future screening or surveillance.  Pathology Results:  A: COLON, CECUM, POLYP:           1. Tubular adenoma           2. Negative for high grade dysplasia           3. Per the colonoscopy report:               a. Polyp size: 3 mm               b. Resection: Complete               c. Retrieval: Complete      B: COLON, ASCENDING, POLYPS:           1. Tubular adenomas (3) and normal colonic mucosa (endoscopically 4 polyps)           2. Negative for high grade dysplasia           3. Per the colonoscopy report:               a. Polyp sizes: 1 mm, 1 mm, 2 mm and 3 mm               b. Resection: Complete               c. Retrieval: Complete      COMMENTS  A,B. We are aware of the patient now having 12 adenomatous polyps of the colon  (4 currently and 8 cumulatively in prior colonoscopies). Consideration for a referral to a genetics specialist for possible genetic testing to evaluate for a potential polyposis syndrome (attenuated familial adenomatous polyposis or MYH polyposis in particular) is reasonable as the results may have implications for both the patient and immediate family members.    Reference:  Isabella Flowers S. When Should Patients Undergo Genetic Testing for Hereditary Colon Cancer Syndromes? Clin Gastroenterol Hepatol. 2018 Feb;16(2):181-183. Epub 2017 Nov 10. PMID: [id number].      MICROSCOPIC  A: Performed   B: Performed     Electronically signed by: Derek Galarza MD    Interpreted at [facility], [address] [place] [id number]    Orders    Instruction(s)/Education:  Instruction/Education Timeframe Assessment   Colon Polyps  K63.5   Diverticulosis/Diverticulitis  K63.5       Final Plan:  Repeat  colonoscopy in 3 years.    We will attempt to contact you at appropriate intervals via [place] mail.  We may not be able to find you or contact you at that time, therefore you should know that the responsibility for following our recommendation rests with you.  If you don't hear from us at the time your procedure is due, please contact our office to schedule an appointment.  If your contact information should change, please contact our office so that we can update your record.      _Electronically signed by:___________________  Diony Narvaez MD                 03/31/2025    cc: Zulema Leo MD

## 2025-06-14 ENCOUNTER — HEALTH MAINTENANCE LETTER (OUTPATIENT)
Age: 75
End: 2025-06-14